# Patient Record
Sex: FEMALE | Race: WHITE | NOT HISPANIC OR LATINO | Employment: PART TIME | URBAN - METROPOLITAN AREA
[De-identification: names, ages, dates, MRNs, and addresses within clinical notes are randomized per-mention and may not be internally consistent; named-entity substitution may affect disease eponyms.]

---

## 2017-01-11 ENCOUNTER — HISTORICAL (OUTPATIENT)
Dept: ADMINISTRATIVE | Facility: HOSPITAL | Age: 67
End: 2017-01-11

## 2017-08-22 ENCOUNTER — HISTORICAL (OUTPATIENT)
Dept: ADMINISTRATIVE | Facility: HOSPITAL | Age: 67
End: 2017-08-22

## 2017-08-22 LAB
ABS NEUT (OLG): 1.55 X10(3)/MCL (ref 2.1–9.2)
ALBUMIN SERPL-MCNC: 4 GM/DL (ref 3.4–5)
ALBUMIN/GLOB SERPL: 1 RATIO (ref 1–2)
ALP SERPL-CCNC: 91 UNIT/L (ref 45–117)
ALT SERPL-CCNC: 25 UNIT/L (ref 12–78)
AST SERPL-CCNC: 17 UNIT/L (ref 15–37)
BASOPHILS # BLD AUTO: 0.02 X10(3)/MCL
BASOPHILS NFR BLD AUTO: 1 % (ref 0–1)
BILIRUB SERPL-MCNC: 0.3 MG/DL (ref 0.2–1)
BILIRUBIN DIRECT+TOT PNL SERPL-MCNC: <0.1 MG/DL
BILIRUBIN DIRECT+TOT PNL SERPL-MCNC: >0.2 MG/DL
BUN SERPL-MCNC: 27 MG/DL (ref 7–18)
CALCIUM SERPL-MCNC: 9 MG/DL (ref 8.5–10.1)
CHLORIDE SERPL-SCNC: 105 MMOL/L (ref 98–107)
CO2 SERPL-SCNC: 30 MMOL/L (ref 21–32)
CREAT SERPL-MCNC: 0.8 MG/DL (ref 0.6–1.3)
EOSINOPHIL # BLD AUTO: 0.13 X10(3)/MCL
EOSINOPHIL NFR BLD AUTO: 4 % (ref 0–5)
ERYTHROCYTE [DISTWIDTH] IN BLOOD BY AUTOMATED COUNT: 13.4 % (ref 11.5–14.5)
GLOBULIN SER-MCNC: 3.1 GM/ML (ref 2.3–3.5)
GLUCOSE SERPL-MCNC: 109 MG/DL (ref 74–106)
HCT VFR BLD AUTO: 39.5 % (ref 35–46)
HGB BLD-MCNC: 13 GM/DL (ref 12–16)
IMM GRANULOCYTES # BLD AUTO: 0.01 10*3/UL
IMM GRANULOCYTES NFR BLD AUTO: 0 %
LDH SERPL-CCNC: 171 UNIT/L (ref 84–246)
LYMPHOCYTES # BLD AUTO: 1.05 X10(3)/MCL
LYMPHOCYTES NFR BLD AUTO: 34 % (ref 15–40)
MCH RBC QN AUTO: 29.6 PG (ref 26–34)
MCHC RBC AUTO-ENTMCNC: 32.9 GM/DL (ref 31–37)
MCV RBC AUTO: 90 FL (ref 80–100)
MONOCYTES # BLD AUTO: 0.33 X10(3)/MCL
MONOCYTES NFR BLD AUTO: 11 % (ref 4–12)
NEUTROPHILS # BLD AUTO: 1.55 X10(3)/MCL
NEUTROPHILS NFR BLD AUTO: 50 X10(3)/MCL
PLATELET # BLD AUTO: 186 X10(3)/MCL (ref 130–400)
PMV BLD AUTO: 10.1 FL (ref 7.4–10.4)
POTASSIUM SERPL-SCNC: 3.7 MMOL/L (ref 3.5–5.1)
PROT SERPL-MCNC: 7.1 GM/DL (ref 6.4–8.2)
RBC # BLD AUTO: 4.39 X10(6)/MCL (ref 4–5.2)
SODIUM SERPL-SCNC: 142 MMOL/L (ref 136–145)
WBC # SPEC AUTO: 3.1 X10(3)/MCL (ref 4.5–11)

## 2017-08-31 ENCOUNTER — HISTORICAL (OUTPATIENT)
Dept: RADIOLOGY | Facility: HOSPITAL | Age: 67
End: 2017-08-31

## 2018-01-31 ENCOUNTER — HISTORICAL (OUTPATIENT)
Dept: ADMINISTRATIVE | Facility: HOSPITAL | Age: 68
End: 2018-01-31

## 2018-03-05 ENCOUNTER — HISTORICAL (OUTPATIENT)
Dept: ADMINISTRATIVE | Facility: HOSPITAL | Age: 68
End: 2018-03-05

## 2018-03-05 LAB
ABS NEUT (OLG): 1.78 X10(3)/MCL (ref 2.1–9.2)
ALBUMIN SERPL-MCNC: 4.1 GM/DL (ref 3.4–5)
ALBUMIN/GLOB SERPL: 1 RATIO (ref 1–2)
ALP SERPL-CCNC: 102 UNIT/L (ref 45–117)
ALT SERPL-CCNC: 35 UNIT/L (ref 12–78)
AST SERPL-CCNC: 23 UNIT/L (ref 15–37)
BASOPHILS # BLD AUTO: 0.01 X10(3)/MCL
BASOPHILS NFR BLD AUTO: 0 %
BILIRUB SERPL-MCNC: 0.4 MG/DL (ref 0.2–1)
BILIRUBIN DIRECT+TOT PNL SERPL-MCNC: <0.1 MG/DL
BILIRUBIN DIRECT+TOT PNL SERPL-MCNC: ABNORMAL MG/DL
BUN SERPL-MCNC: 18 MG/DL (ref 7–18)
CALCIUM SERPL-MCNC: 9.4 MG/DL (ref 8.5–10.1)
CHLORIDE SERPL-SCNC: 104 MMOL/L (ref 98–107)
CO2 SERPL-SCNC: 33 MMOL/L (ref 21–32)
CREAT SERPL-MCNC: 0.8 MG/DL (ref 0.6–1.3)
EOSINOPHIL # BLD AUTO: 0.06 X10(3)/MCL
EOSINOPHIL NFR BLD AUTO: 2 %
ERYTHROCYTE [DISTWIDTH] IN BLOOD BY AUTOMATED COUNT: 13.4 % (ref 11.5–14.5)
GLOBULIN SER-MCNC: 3.5 GM/ML (ref 2.3–3.5)
GLUCOSE SERPL-MCNC: 92 MG/DL (ref 74–106)
HCT VFR BLD AUTO: 40.7 % (ref 35–46)
HGB BLD-MCNC: 13.5 GM/DL (ref 12–16)
IMM GRANULOCYTES # BLD AUTO: 0.02 10*3/UL
IMM GRANULOCYTES NFR BLD AUTO: 1 %
LDH SERPL-CCNC: 162 UNIT/L (ref 84–246)
LYMPHOCYTES # BLD AUTO: 1.06 X10(3)/MCL
LYMPHOCYTES NFR BLD AUTO: 33 % (ref 13–40)
MCH RBC QN AUTO: 30.6 PG (ref 26–34)
MCHC RBC AUTO-ENTMCNC: 33.2 GM/DL (ref 31–37)
MCV RBC AUTO: 92.3 FL (ref 80–100)
MONOCYTES # BLD AUTO: 0.27 X10(3)/MCL
MONOCYTES NFR BLD AUTO: 8 % (ref 4–12)
NEUTROPHILS # BLD AUTO: 1.78 X10(3)/MCL
NEUTROPHILS NFR BLD AUTO: 56 X10(3)/MCL
PLATELET # BLD AUTO: 220 X10(3)/MCL (ref 130–400)
PMV BLD AUTO: 9.7 FL (ref 7.4–10.4)
POTASSIUM SERPL-SCNC: 3.9 MMOL/L (ref 3.5–5.1)
PROT SERPL-MCNC: 7.6 GM/DL (ref 6.4–8.2)
RBC # BLD AUTO: 4.41 X10(6)/MCL (ref 4–5.2)
SODIUM SERPL-SCNC: 143 MMOL/L (ref 136–145)
WBC # SPEC AUTO: 3.2 X10(3)/MCL (ref 4.5–11)

## 2018-08-06 ENCOUNTER — HISTORICAL (OUTPATIENT)
Dept: RADIOLOGY | Facility: HOSPITAL | Age: 68
End: 2018-08-06

## 2018-08-06 LAB
ABS NEUT (OLG): 1.49 X10(3)/MCL (ref 2.1–9.2)
ALBUMIN SERPL-MCNC: 4.1 GM/DL (ref 3.4–5)
ALBUMIN/GLOB SERPL: 1 RATIO (ref 1–2)
ALP SERPL-CCNC: 93 UNIT/L (ref 45–117)
ALT SERPL-CCNC: 24 UNIT/L (ref 12–78)
APPEARANCE, UA: CLEAR
AST SERPL-CCNC: 21 UNIT/L (ref 15–37)
BACTERIA #/AREA URNS AUTO: NORMAL /[HPF]
BASOPHILS # BLD AUTO: 0.02 X10(3)/MCL
BASOPHILS NFR BLD AUTO: 1 %
BILIRUB SERPL-MCNC: 0.3 MG/DL (ref 0.2–1)
BILIRUB UR QL STRIP: NEGATIVE
BILIRUBIN DIRECT+TOT PNL SERPL-MCNC: <0.1 MG/DL
BILIRUBIN DIRECT+TOT PNL SERPL-MCNC: ABNORMAL MG/DL
BUN SERPL-MCNC: 19 MG/DL (ref 7–18)
CALCIUM SERPL-MCNC: 9.2 MG/DL (ref 8.5–10.1)
CHLORIDE SERPL-SCNC: 107 MMOL/L (ref 98–107)
CHOLEST SERPL-MCNC: 254 MG/DL
CHOLEST/HDLC SERPL: 3.6 {RATIO} (ref 0–4.4)
CO2 SERPL-SCNC: 30 MMOL/L (ref 21–32)
COLOR UR: YELLOW
CREAT SERPL-MCNC: 0.8 MG/DL (ref 0.6–1.3)
EOSINOPHIL # BLD AUTO: 0.1 10*3/UL
EOSINOPHIL NFR BLD AUTO: 3 %
ERYTHROCYTE [DISTWIDTH] IN BLOOD BY AUTOMATED COUNT: 13.3 % (ref 11.5–14.5)
GLOBULIN SER-MCNC: 3.5 GM/ML (ref 2.3–3.5)
GLUCOSE (UA): NORMAL
GLUCOSE SERPL-MCNC: 103 MG/DL (ref 74–106)
HCT VFR BLD AUTO: 39.8 % (ref 35–46)
HDLC SERPL-MCNC: 70 MG/DL
HGB BLD-MCNC: 13.1 GM/DL (ref 12–16)
HGB UR QL STRIP: NEGATIVE
HYALINE CASTS #/AREA URNS LPF: 0 /[LPF]
IMM GRANULOCYTES # BLD AUTO: 0.01 10*3/UL
IMM GRANULOCYTES NFR BLD AUTO: 0 %
KETONES UR QL STRIP: NEGATIVE
LDH SERPL-CCNC: 169 UNIT/L (ref 84–246)
LDLC SERPL CALC-MCNC: 161 MG/DL (ref 0–130)
LEUKOCYTE ESTERASE UR QL STRIP: 25 LEU/UL
LYMPHOCYTES # BLD AUTO: 1.37 X10(3)/MCL
LYMPHOCYTES NFR BLD AUTO: 42 % (ref 13–40)
MCH RBC QN AUTO: 30.6 PG (ref 26–34)
MCHC RBC AUTO-ENTMCNC: 32.9 GM/DL (ref 31–37)
MCV RBC AUTO: 93 FL (ref 80–100)
MONOCYTES # BLD AUTO: 0.29 X10(3)/MCL
MONOCYTES NFR BLD AUTO: 9 % (ref 4–12)
NEUTROPHILS # BLD AUTO: 1.49 X10(3)/MCL
NEUTROPHILS NFR BLD AUTO: 46 X10(3)/MCL
NITRITE UR QL STRIP: NEGATIVE
PH UR STRIP: 6.5 [PH] (ref 4.5–8)
PLATELET # BLD AUTO: 229 X10(3)/MCL (ref 130–400)
PMV BLD AUTO: 10.1 FL (ref 7.4–10.4)
POTASSIUM SERPL-SCNC: 4 MMOL/L (ref 3.5–5.1)
PROT SERPL-MCNC: 7.6 GM/DL (ref 6.4–8.2)
PROT UR QL STRIP: NEGATIVE
RBC # BLD AUTO: 4.28 X10(6)/MCL (ref 4–5.2)
RBC #/AREA URNS AUTO: NORMAL /[HPF]
SODIUM SERPL-SCNC: 143 MMOL/L (ref 136–145)
SP GR UR STRIP: 1.02 (ref 1–1.03)
SQUAMOUS #/AREA URNS LPF: NORMAL /[LPF]
T4 FREE SERPL-MCNC: 0.9 NG/DL (ref 0.76–1.46)
TRIGL SERPL-MCNC: 116 MG/DL
TSH SERPL-ACNC: 6.35 MIU/L (ref 0.36–3.74)
UROBILINOGEN UR STRIP-ACNC: NORMAL
VLDLC SERPL CALC-MCNC: 23 MG/DL
WBC # SPEC AUTO: 3.3 X10(3)/MCL (ref 4.5–11)
WBC #/AREA URNS AUTO: NORMAL /HPF

## 2018-08-20 ENCOUNTER — HISTORICAL (OUTPATIENT)
Dept: RADIOLOGY | Facility: HOSPITAL | Age: 68
End: 2018-08-20

## 2018-10-31 ENCOUNTER — HISTORICAL (OUTPATIENT)
Dept: SLEEP MEDICINE | Facility: HOSPITAL | Age: 68
End: 2018-10-31

## 2019-01-08 ENCOUNTER — HISTORICAL (OUTPATIENT)
Dept: INTERNAL MEDICINE | Facility: CLINIC | Age: 69
End: 2019-01-08

## 2019-01-08 LAB
ALBUMIN SERPL-MCNC: 4 GM/DL (ref 3.4–5)
ALBUMIN/GLOB SERPL: 1 RATIO (ref 1–2)
ALP SERPL-CCNC: 97 UNIT/L (ref 45–117)
ALT SERPL-CCNC: 27 UNIT/L (ref 12–78)
AST SERPL-CCNC: 19 UNIT/L (ref 15–37)
BILIRUB SERPL-MCNC: 0.3 MG/DL (ref 0.2–1)
BILIRUBIN DIRECT+TOT PNL SERPL-MCNC: <0.1 MG/DL
BILIRUBIN DIRECT+TOT PNL SERPL-MCNC: ABNORMAL MG/DL
BUN SERPL-MCNC: 19 MG/DL (ref 7–18)
CALCIUM SERPL-MCNC: 9.1 MG/DL (ref 8.5–10.1)
CHLORIDE SERPL-SCNC: 105 MMOL/L (ref 98–107)
CHOLEST SERPL-MCNC: 262 MG/DL
CHOLEST/HDLC SERPL: 3.6 {RATIO} (ref 0–4.4)
CO2 SERPL-SCNC: 31 MMOL/L (ref 21–32)
CREAT SERPL-MCNC: 0.8 MG/DL (ref 0.6–1.3)
GLOBULIN SER-MCNC: 3.5 GM/ML (ref 2.3–3.5)
GLUCOSE SERPL-MCNC: 96 MG/DL (ref 74–106)
HDLC SERPL-MCNC: 72 MG/DL
LDLC SERPL CALC-MCNC: 168 MG/DL (ref 0–130)
POTASSIUM SERPL-SCNC: 4.1 MMOL/L (ref 3.5–5.1)
PROT SERPL-MCNC: 7.5 GM/DL (ref 6.4–8.2)
SODIUM SERPL-SCNC: 142 MMOL/L (ref 136–145)
TRIGL SERPL-MCNC: 111 MG/DL
VLDLC SERPL CALC-MCNC: 22 MG/DL

## 2019-01-17 ENCOUNTER — HISTORICAL (OUTPATIENT)
Dept: RADIOLOGY | Facility: HOSPITAL | Age: 69
End: 2019-01-17

## 2019-02-26 ENCOUNTER — HISTORICAL (OUTPATIENT)
Dept: ADMINISTRATIVE | Facility: HOSPITAL | Age: 69
End: 2019-02-26

## 2019-05-08 ENCOUNTER — HISTORICAL (OUTPATIENT)
Dept: INTERNAL MEDICINE | Facility: CLINIC | Age: 69
End: 2019-05-08

## 2019-05-08 LAB
ALBUMIN SERPL-MCNC: 4 GM/DL (ref 3.4–5)
ALBUMIN/GLOB SERPL: 1.1 RATIO (ref 1.1–2)
ALP SERPL-CCNC: 92 UNIT/L (ref 45–117)
ALT SERPL-CCNC: 27 UNIT/L (ref 12–78)
AST SERPL-CCNC: 22 UNIT/L (ref 15–37)
BILIRUB SERPL-MCNC: 0.2 MG/DL (ref 0.2–1)
BILIRUBIN DIRECT+TOT PNL SERPL-MCNC: <0.1 MG/DL
BILIRUBIN DIRECT+TOT PNL SERPL-MCNC: ABNORMAL MG/DL
BUN SERPL-MCNC: 18 MG/DL (ref 7–18)
CALCIUM SERPL-MCNC: 9.7 MG/DL (ref 8.5–10.1)
CHLORIDE SERPL-SCNC: 110 MMOL/L (ref 98–107)
CHOLEST SERPL-MCNC: 195 MG/DL
CHOLEST/HDLC SERPL: 2.6 {RATIO} (ref 0–4.4)
CO2 SERPL-SCNC: 31 MMOL/L (ref 21–32)
CREAT SERPL-MCNC: 0.8 MG/DL (ref 0.6–1.3)
GLOBULIN SER-MCNC: 3.5 GM/ML (ref 2.3–3.5)
GLUCOSE SERPL-MCNC: 100 MG/DL (ref 74–106)
HDLC SERPL-MCNC: 74 MG/DL
LDLC SERPL CALC-MCNC: 99 MG/DL (ref 0–130)
POTASSIUM SERPL-SCNC: 5 MMOL/L (ref 3.5–5.1)
PROT SERPL-MCNC: 7.5 GM/DL (ref 6.4–8.2)
SODIUM SERPL-SCNC: 144 MMOL/L (ref 136–145)
TRIGL SERPL-MCNC: 110 MG/DL
VLDLC SERPL CALC-MCNC: 22 MG/DL

## 2019-08-13 ENCOUNTER — HISTORICAL (OUTPATIENT)
Dept: RADIOLOGY | Facility: HOSPITAL | Age: 69
End: 2019-08-13

## 2019-08-13 LAB
ABS NEUT (OLG): 1.47 X10(3)/MCL (ref 2.1–9.2)
ALBUMIN SERPL-MCNC: 4 GM/DL (ref 3.4–5)
ALBUMIN/GLOB SERPL: 1.2 RATIO (ref 1.1–2)
ALP SERPL-CCNC: 94 UNIT/L (ref 45–117)
ALT SERPL-CCNC: 26 UNIT/L (ref 12–78)
AST SERPL-CCNC: 19 UNIT/L (ref 15–37)
BASOPHILS # BLD AUTO: 0.02 X10(3)/MCL
BASOPHILS NFR BLD AUTO: 0 %
BILIRUB SERPL-MCNC: 0.2 MG/DL (ref 0.2–1)
BILIRUBIN DIRECT+TOT PNL SERPL-MCNC: <0.1 MG/DL
BILIRUBIN DIRECT+TOT PNL SERPL-MCNC: ABNORMAL MG/DL
BUN SERPL-MCNC: 23 MG/DL (ref 7–18)
CALCIUM SERPL-MCNC: 9.1 MG/DL (ref 8.5–10.1)
CHLORIDE SERPL-SCNC: 111 MMOL/L (ref 98–107)
CO2 SERPL-SCNC: 26 MMOL/L (ref 21–32)
CREAT SERPL-MCNC: 0.7 MG/DL (ref 0.6–1.3)
EOSINOPHIL # BLD AUTO: 0.08 X10(3)/MCL
EOSINOPHIL NFR BLD AUTO: 2 %
ERYTHROCYTE [DISTWIDTH] IN BLOOD BY AUTOMATED COUNT: 13.2 % (ref 11.5–14.5)
GLOBULIN SER-MCNC: 3.3 GM/ML (ref 2.3–3.5)
GLUCOSE SERPL-MCNC: 101 MG/DL (ref 74–106)
HCT VFR BLD AUTO: 41.3 % (ref 35–46)
HGB BLD-MCNC: 13.4 GM/DL (ref 12–16)
LDH SERPL-CCNC: 148 UNIT/L (ref 84–246)
LYMPHOCYTES # BLD AUTO: 1.9 X10(3)/MCL
LYMPHOCYTES NFR BLD AUTO: 49 % (ref 13–40)
MCH RBC QN AUTO: 30 PG (ref 26–34)
MCHC RBC AUTO-ENTMCNC: 32.4 GM/DL (ref 31–37)
MCV RBC AUTO: 92.6 FL (ref 80–100)
MONOCYTES # BLD AUTO: 0.42 X10(3)/MCL
MONOCYTES NFR BLD AUTO: 11 % (ref 0–24)
NEUTROPHILS # BLD AUTO: 1.47 X10(3)/MCL
NEUTROPHILS NFR BLD AUTO: 38 X10(3)/MCL
PLATELET # BLD AUTO: 216 X10(3)/MCL (ref 130–400)
PMV BLD AUTO: 9.9 FL (ref 7.4–10.4)
POTASSIUM SERPL-SCNC: 3.9 MMOL/L (ref 3.5–5.1)
PROT SERPL-MCNC: 7.3 GM/DL (ref 6.4–8.2)
RBC # BLD AUTO: 4.46 X10(6)/MCL (ref 4–5.2)
SODIUM SERPL-SCNC: 143 MMOL/L (ref 136–145)
WBC # SPEC AUTO: 3.9 X10(3)/MCL (ref 4.5–11)

## 2019-11-08 ENCOUNTER — HISTORICAL (OUTPATIENT)
Dept: INTERNAL MEDICINE | Facility: CLINIC | Age: 69
End: 2019-11-08

## 2019-11-08 LAB
ALBUMIN SERPL-MCNC: 4 GM/DL (ref 3.4–5)
ALBUMIN/GLOB SERPL: 1.1 RATIO (ref 1.1–2)
ALP SERPL-CCNC: 101 UNIT/L (ref 45–117)
ALT SERPL-CCNC: 25 UNIT/L (ref 12–78)
AST SERPL-CCNC: 18 UNIT/L (ref 15–37)
BILIRUB SERPL-MCNC: 0.4 MG/DL (ref 0.2–1)
BILIRUBIN DIRECT+TOT PNL SERPL-MCNC: <0.1 MG/DL (ref 0–0.2)
BILIRUBIN DIRECT+TOT PNL SERPL-MCNC: ABNORMAL MG/DL
BUN SERPL-MCNC: 18 MG/DL (ref 7–18)
CALCIUM SERPL-MCNC: 9.4 MG/DL (ref 8.5–10.1)
CHLORIDE SERPL-SCNC: 108 MMOL/L (ref 98–107)
CHOLEST SERPL-MCNC: 222 MG/DL
CHOLEST/HDLC SERPL: 3 {RATIO} (ref 0–4.4)
CO2 SERPL-SCNC: 31 MMOL/L (ref 21–32)
CREAT SERPL-MCNC: 0.8 MG/DL (ref 0.6–1.3)
GLOBULIN SER-MCNC: 3.6 GM/ML (ref 2.3–3.5)
GLUCOSE SERPL-MCNC: 97 MG/DL (ref 74–106)
HDLC SERPL-MCNC: 73 MG/DL (ref 40–59)
LDLC SERPL CALC-MCNC: 130 MG/DL
POTASSIUM SERPL-SCNC: 4.2 MMOL/L (ref 3.5–5.1)
PROT SERPL-MCNC: 7.6 GM/DL (ref 6.4–8.2)
SODIUM SERPL-SCNC: 144 MMOL/L (ref 136–145)
TRIGL SERPL-MCNC: 94 MG/DL
VLDLC SERPL CALC-MCNC: 19 MG/DL

## 2020-01-20 ENCOUNTER — HISTORICAL (OUTPATIENT)
Dept: RADIOLOGY | Facility: HOSPITAL | Age: 70
End: 2020-01-20

## 2020-02-10 ENCOUNTER — HISTORICAL (OUTPATIENT)
Dept: INTERNAL MEDICINE | Facility: CLINIC | Age: 70
End: 2020-02-10

## 2020-02-10 LAB
ABS NEUT (OLG): 2.76 X10(3)/MCL (ref 2.1–9.2)
ALBUMIN SERPL-MCNC: 3.9 GM/DL (ref 3.4–5)
ALBUMIN/GLOB SERPL: 1.1 RATIO (ref 1.1–2)
ALP SERPL-CCNC: 89 UNIT/L (ref 45–117)
ALT SERPL-CCNC: 23 UNIT/L (ref 12–78)
AST SERPL-CCNC: 16 UNIT/L (ref 15–37)
BASOPHILS # BLD AUTO: 0 X10(3)/MCL (ref 0–0.2)
BASOPHILS NFR BLD AUTO: 0 %
BILIRUB SERPL-MCNC: 0.2 MG/DL (ref 0.2–1)
BILIRUBIN DIRECT+TOT PNL SERPL-MCNC: <0.1 MG/DL (ref 0–0.2)
BILIRUBIN DIRECT+TOT PNL SERPL-MCNC: >0.1 MG/DL
BUN SERPL-MCNC: 20 MG/DL (ref 7–18)
CALCIUM SERPL-MCNC: 9.2 MG/DL (ref 8.5–10.1)
CHLORIDE SERPL-SCNC: 108 MMOL/L (ref 98–107)
CHOLEST SERPL-MCNC: 221 MG/DL
CHOLEST/HDLC SERPL: 2.8 {RATIO} (ref 0–4.4)
CO2 SERPL-SCNC: 28 MMOL/L (ref 21–32)
CREAT SERPL-MCNC: 0.8 MG/DL (ref 0.6–1.3)
EOSINOPHIL # BLD AUTO: 0.1 X10(3)/MCL (ref 0–0.9)
EOSINOPHIL NFR BLD AUTO: 2 %
ERYTHROCYTE [DISTWIDTH] IN BLOOD BY AUTOMATED COUNT: 13.3 % (ref 11.5–14.5)
GLOBULIN SER-MCNC: 3.5 GM/ML (ref 2.3–3.5)
GLUCOSE SERPL-MCNC: 93 MG/DL (ref 74–106)
HCT VFR BLD AUTO: 40.3 % (ref 35–46)
HDLC SERPL-MCNC: 79 MG/DL (ref 40–59)
HGB BLD-MCNC: 12.9 GM/DL (ref 12–16)
IMM GRANULOCYTES # BLD AUTO: 0.01 10*3/UL
IMM GRANULOCYTES NFR BLD AUTO: 0 %
LDLC SERPL CALC-MCNC: 117 MG/DL
LYMPHOCYTES # BLD AUTO: 1.4 X10(3)/MCL (ref 0.6–4.6)
LYMPHOCYTES NFR BLD AUTO: 30 %
MCH RBC QN AUTO: 29.9 PG (ref 26–34)
MCHC RBC AUTO-ENTMCNC: 32 GM/DL (ref 31–37)
MCV RBC AUTO: 93.5 FL (ref 80–100)
MONOCYTES # BLD AUTO: 0.4 X10(3)/MCL (ref 0.1–1.3)
MONOCYTES NFR BLD AUTO: 8 %
NEUTROPHILS # BLD AUTO: 2.76 X10(3)/MCL (ref 2.1–9.2)
NEUTROPHILS NFR BLD AUTO: 60 %
PLATELET # BLD AUTO: 214 X10(3)/MCL (ref 130–400)
PMV BLD AUTO: 9.9 FL (ref 7.4–10.4)
POTASSIUM SERPL-SCNC: 3.8 MMOL/L (ref 3.5–5.1)
PROT SERPL-MCNC: 7.4 GM/DL (ref 6.4–8.2)
RBC # BLD AUTO: 4.31 X10(6)/MCL (ref 4–5.2)
SODIUM SERPL-SCNC: 141 MMOL/L (ref 136–145)
TRIGL SERPL-MCNC: 124 MG/DL
VLDLC SERPL CALC-MCNC: 25 MG/DL
WBC # SPEC AUTO: 4.6 X10(3)/MCL (ref 4.5–11)

## 2020-03-08 ENCOUNTER — HISTORICAL (OUTPATIENT)
Dept: ADMINISTRATIVE | Facility: HOSPITAL | Age: 70
End: 2020-03-08

## 2020-03-10 LAB — FINAL CULTURE: NORMAL

## 2020-08-21 ENCOUNTER — HISTORICAL (OUTPATIENT)
Dept: ADMINISTRATIVE | Facility: HOSPITAL | Age: 70
End: 2020-08-21

## 2020-08-21 LAB
ALBUMIN SERPL-MCNC: 4 GM/DL (ref 3.4–5)
ALBUMIN/GLOB SERPL: 1.1 RATIO (ref 1.1–2)
ALP SERPL-CCNC: 88 UNIT/L (ref 45–117)
ALT SERPL-CCNC: 25 UNIT/L (ref 12–78)
AST SERPL-CCNC: 22 UNIT/L (ref 15–37)
BILIRUB SERPL-MCNC: 0.3 MG/DL (ref 0.2–1)
BILIRUBIN DIRECT+TOT PNL SERPL-MCNC: <0.1 MG/DL (ref 0–0.2)
BILIRUBIN DIRECT+TOT PNL SERPL-MCNC: ABNORMAL MG/DL
BUN SERPL-MCNC: 21 MG/DL (ref 7–18)
CALCIUM SERPL-MCNC: 9.4 MG/DL (ref 8.5–10.1)
CHLORIDE SERPL-SCNC: 106 MMOL/L (ref 98–107)
CHOLEST SERPL-MCNC: 242 MG/DL
CHOLEST/HDLC SERPL: 3.9 {RATIO} (ref 0–4.4)
CO2 SERPL-SCNC: 29 MMOL/L (ref 21–32)
CREAT SERPL-MCNC: 0.8 MG/DL (ref 0.6–1.3)
GLOBULIN SER-MCNC: 3.6 GM/ML (ref 2.3–3.5)
GLUCOSE SERPL-MCNC: 99 MG/DL (ref 74–106)
HDLC SERPL-MCNC: 62 MG/DL (ref 40–59)
LDLC SERPL CALC-MCNC: 139 MG/DL
POTASSIUM SERPL-SCNC: 3.8 MMOL/L (ref 3.5–5.1)
PROT SERPL-MCNC: 7.6 GM/DL (ref 6.4–8.2)
SODIUM SERPL-SCNC: 142 MMOL/L (ref 136–145)
TRIGL SERPL-MCNC: 206 MG/DL
VLDLC SERPL CALC-MCNC: 41 MG/DL

## 2020-08-24 ENCOUNTER — HISTORICAL (OUTPATIENT)
Dept: RADIOLOGY | Facility: HOSPITAL | Age: 70
End: 2020-08-24

## 2020-08-28 ENCOUNTER — HISTORICAL (OUTPATIENT)
Dept: RADIOLOGY | Facility: HOSPITAL | Age: 70
End: 2020-08-28

## 2020-09-04 ENCOUNTER — HISTORICAL (OUTPATIENT)
Dept: ADMINISTRATIVE | Facility: HOSPITAL | Age: 70
End: 2020-09-04

## 2020-09-04 LAB
ABS NEUT (OLG): 1.14 X10(3)/MCL (ref 2.1–9.2)
ALBUMIN SERPL-MCNC: 3.9 GM/DL (ref 3.4–5)
ALBUMIN/GLOB SERPL: 1.2 RATIO (ref 1.1–2)
ALP SERPL-CCNC: 86 UNIT/L (ref 45–117)
ALT SERPL-CCNC: 26 UNIT/L (ref 12–78)
AST SERPL-CCNC: 18 UNIT/L (ref 15–37)
BASOPHILS NFR BLD MANUAL: 0 %
BILIRUB SERPL-MCNC: 0.3 MG/DL (ref 0.2–1)
BILIRUBIN DIRECT+TOT PNL SERPL-MCNC: <0.1 MG/DL (ref 0–0.2)
BILIRUBIN DIRECT+TOT PNL SERPL-MCNC: ABNORMAL MG/DL
BUN SERPL-MCNC: 21 MG/DL (ref 7–18)
CALCIUM SERPL-MCNC: 9.2 MG/DL (ref 8.5–10.1)
CHLORIDE SERPL-SCNC: 108 MMOL/L (ref 98–107)
CO2 SERPL-SCNC: 28 MMOL/L (ref 21–32)
CREAT SERPL-MCNC: 0.8 MG/DL (ref 0.6–1.3)
EOSINOPHIL NFR BLD MANUAL: 2 %
ERYTHROCYTE [DISTWIDTH] IN BLOOD BY AUTOMATED COUNT: 13.2 % (ref 11.5–14.5)
GLOBULIN SER-MCNC: 3.3 GM/ML (ref 2.3–3.5)
GLUCOSE SERPL-MCNC: 89 MG/DL (ref 74–106)
GRANULOCYTES NFR BLD MANUAL: 38 % (ref 43–75)
HCT VFR BLD AUTO: 42 % (ref 35–46)
HGB BLD-MCNC: 13.4 GM/DL (ref 12–16)
LDH SERPL-CCNC: 150 UNIT/L (ref 84–246)
LYMPHOCYTES NFR BLD MANUAL: 56 % (ref 20.5–51.1)
MCH RBC QN AUTO: 30.1 PG (ref 26–34)
MCHC RBC AUTO-ENTMCNC: 31.9 GM/DL (ref 31–37)
MCV RBC AUTO: 94.4 FL (ref 80–100)
MONOCYTES NFR BLD MANUAL: 4 % (ref 2–9)
PLATELET # BLD AUTO: 226 X10(3)/MCL (ref 130–400)
PLATELET # BLD EST: ADEQUATE 10*3/UL
PMV BLD AUTO: 10.1 FL (ref 7.4–10.4)
POTASSIUM SERPL-SCNC: 4.4 MMOL/L (ref 3.5–5.1)
PROT SERPL-MCNC: 7.2 GM/DL (ref 6.4–8.2)
RBC # BLD AUTO: 4.45 X10(6)/MCL (ref 4–5.2)
RBC MORPH BLD: NORMAL
SODIUM SERPL-SCNC: 142 MMOL/L (ref 136–145)
WBC # SPEC AUTO: 3.7 X10(3)/MCL (ref 4.5–11)

## 2020-10-07 ENCOUNTER — HISTORICAL (OUTPATIENT)
Dept: ADMINISTRATIVE | Facility: HOSPITAL | Age: 70
End: 2020-10-07

## 2020-10-07 LAB
ABS NEUT (OLG): 2.08 X10(3)/MCL (ref 2.1–9.2)
ALBUMIN SERPL-MCNC: 4 GM/DL (ref 3.4–5)
ALBUMIN/GLOB SERPL: 1.2 RATIO (ref 1.1–2)
ALP SERPL-CCNC: 86 UNIT/L (ref 45–117)
ALT SERPL-CCNC: 26 UNIT/L (ref 12–78)
AST SERPL-CCNC: 17 UNIT/L (ref 15–37)
BASOPHILS # BLD AUTO: 0 X10(3)/MCL (ref 0–0.2)
BASOPHILS NFR BLD AUTO: 0 %
BILIRUB SERPL-MCNC: 0.2 MG/DL (ref 0.2–1)
BILIRUBIN DIRECT+TOT PNL SERPL-MCNC: <0.1 MG/DL (ref 0–0.2)
BILIRUBIN DIRECT+TOT PNL SERPL-MCNC: >0.1 MG/DL
BUN SERPL-MCNC: 22 MG/DL (ref 7–18)
CALCIUM SERPL-MCNC: 9.9 MG/DL (ref 8.5–10.1)
CHLORIDE SERPL-SCNC: 106 MMOL/L (ref 98–107)
CO2 SERPL-SCNC: 30 MMOL/L (ref 21–32)
CREAT SERPL-MCNC: 0.8 MG/DL (ref 0.6–1.3)
EOSINOPHIL # BLD AUTO: 0.1 X10(3)/MCL (ref 0–0.9)
EOSINOPHIL NFR BLD AUTO: 2 %
ERYTHROCYTE [DISTWIDTH] IN BLOOD BY AUTOMATED COUNT: 13.5 % (ref 11.5–14.5)
GLOBULIN SER-MCNC: 3.4 GM/ML (ref 2.3–3.5)
GLUCOSE SERPL-MCNC: 107 MG/DL (ref 74–106)
HCT VFR BLD AUTO: 39.6 % (ref 35–46)
HGB BLD-MCNC: 12.8 GM/DL (ref 12–16)
IMM GRANULOCYTES # BLD AUTO: 0.01 10*3/UL
IMM GRANULOCYTES NFR BLD AUTO: 0 %
LDH SERPL-CCNC: 152 UNIT/L (ref 84–246)
LYMPHOCYTES # BLD AUTO: 1.6 X10(3)/MCL (ref 0.6–4.6)
LYMPHOCYTES NFR BLD AUTO: 38 %
MCH RBC QN AUTO: 30.3 PG (ref 26–34)
MCHC RBC AUTO-ENTMCNC: 32.3 GM/DL (ref 31–37)
MCV RBC AUTO: 93.8 FL (ref 80–100)
MONOCYTES # BLD AUTO: 0.4 X10(3)/MCL (ref 0.1–1.3)
MONOCYTES NFR BLD AUTO: 9 %
NEUTROPHILS # BLD AUTO: 2.08 X10(3)/MCL (ref 2.1–9.2)
NEUTROPHILS NFR BLD AUTO: 49 %
PLATELET # BLD AUTO: 224 X10(3)/MCL (ref 130–400)
PMV BLD AUTO: 10.6 FL (ref 7.4–10.4)
POTASSIUM SERPL-SCNC: 4.4 MMOL/L (ref 3.5–5.1)
PROT SERPL-MCNC: 7.4 GM/DL (ref 6.4–8.2)
RBC # BLD AUTO: 4.22 X10(6)/MCL (ref 4–5.2)
SODIUM SERPL-SCNC: 141 MMOL/L (ref 136–145)
WBC # SPEC AUTO: 4.2 X10(3)/MCL (ref 4.5–11)

## 2020-12-29 ENCOUNTER — HISTORICAL (OUTPATIENT)
Dept: ADMINISTRATIVE | Facility: HOSPITAL | Age: 70
End: 2020-12-29

## 2020-12-29 LAB
FLUAV AG UPPER RESP QL IA.RAPID: NEGATIVE
FLUBV AG UPPER RESP QL IA.RAPID: NEGATIVE
SARS-COV-2 RNA RESP QL NAA+PROBE: NOT DETECTED

## 2021-02-25 ENCOUNTER — HISTORICAL (OUTPATIENT)
Dept: RADIOLOGY | Facility: HOSPITAL | Age: 71
End: 2021-02-25

## 2021-02-26 ENCOUNTER — HISTORICAL (OUTPATIENT)
Dept: ADMINISTRATIVE | Facility: HOSPITAL | Age: 71
End: 2021-02-26

## 2021-02-26 LAB
ABS NEUT (OLG): 1.39 X10(3)/MCL (ref 2.1–9.2)
ALBUMIN SERPL-MCNC: 4.3 GM/DL (ref 3.4–4.8)
ALBUMIN/GLOB SERPL: 1.5 RATIO (ref 1.1–2)
ALP SERPL-CCNC: 89 UNIT/L (ref 40–150)
ALT SERPL-CCNC: 24 UNIT/L (ref 0–55)
AST SERPL-CCNC: 23 UNIT/L (ref 5–34)
BASOPHILS # BLD AUTO: 0 X10(3)/MCL (ref 0–0.2)
BASOPHILS NFR BLD AUTO: 1 %
BILIRUB SERPL-MCNC: 0.5 MG/DL
BILIRUBIN DIRECT+TOT PNL SERPL-MCNC: 0.2 MG/DL (ref 0–0.5)
BILIRUBIN DIRECT+TOT PNL SERPL-MCNC: 0.3 MG/DL (ref 0–0.8)
BUN SERPL-MCNC: 16.7 MG/DL (ref 9.8–20.1)
CALCIUM SERPL-MCNC: 9.3 MG/DL (ref 8.4–10.2)
CHLORIDE SERPL-SCNC: 103 MMOL/L (ref 98–107)
CHOLEST SERPL-MCNC: 225 MG/DL
CHOLEST/HDLC SERPL: 3 {RATIO} (ref 0–5)
CO2 SERPL-SCNC: 29 MMOL/L (ref 23–31)
CREAT SERPL-MCNC: 0.79 MG/DL (ref 0.55–1.02)
EOSINOPHIL # BLD AUTO: 0.1 X10(3)/MCL (ref 0–0.9)
EOSINOPHIL NFR BLD AUTO: 2 %
ERYTHROCYTE [DISTWIDTH] IN BLOOD BY AUTOMATED COUNT: 13.6 % (ref 11.5–14.5)
GLOBULIN SER-MCNC: 2.9 GM/DL (ref 2.4–3.5)
GLUCOSE SERPL-MCNC: 98 MG/DL (ref 82–115)
HCT VFR BLD AUTO: 40.5 % (ref 35–46)
HDLC SERPL-MCNC: 72 MG/DL (ref 35–60)
HGB BLD-MCNC: 13.1 GM/DL (ref 12–16)
IMM GRANULOCYTES # BLD AUTO: 0.01 10*3/UL
IMM GRANULOCYTES NFR BLD AUTO: 0 %
LDLC SERPL CALC-MCNC: 133 MG/DL (ref 50–140)
LYMPHOCYTES # BLD AUTO: 1.4 X10(3)/MCL (ref 0.6–4.6)
LYMPHOCYTES NFR BLD AUTO: 43 %
MCH RBC QN AUTO: 30.5 PG (ref 26–34)
MCHC RBC AUTO-ENTMCNC: 32.3 GM/DL (ref 31–37)
MCV RBC AUTO: 94.4 FL (ref 80–100)
MONOCYTES # BLD AUTO: 0.3 X10(3)/MCL (ref 0.1–1.3)
MONOCYTES NFR BLD AUTO: 10 %
NEUTROPHILS # BLD AUTO: 1.39 X10(3)/MCL (ref 2.1–9.2)
NEUTROPHILS NFR BLD AUTO: 44 %
PLATELET # BLD AUTO: 215 X10(3)/MCL (ref 130–400)
PMV BLD AUTO: 10.5 FL (ref 7.4–10.4)
POTASSIUM SERPL-SCNC: 3.8 MMOL/L (ref 3.5–5.1)
PROT SERPL-MCNC: 7.2 GM/DL (ref 5.8–7.6)
RBC # BLD AUTO: 4.29 X10(6)/MCL (ref 4–5.2)
SODIUM SERPL-SCNC: 141 MMOL/L (ref 136–145)
TRIGL SERPL-MCNC: 99 MG/DL (ref 37–140)
VLDLC SERPL CALC-MCNC: 20 MG/DL
WBC # SPEC AUTO: 3.2 X10(3)/MCL (ref 4.5–11)

## 2021-09-10 ENCOUNTER — HISTORICAL (OUTPATIENT)
Dept: LAB | Facility: HOSPITAL | Age: 71
End: 2021-09-10

## 2021-09-10 LAB
ALBUMIN SERPL-MCNC: 3.8 GM/DL (ref 3.4–4.8)
ALBUMIN/GLOB SERPL: 1.2 RATIO (ref 1.1–2)
ALP SERPL-CCNC: 83 UNIT/L (ref 40–150)
ALT SERPL-CCNC: 26 UNIT/L (ref 0–55)
AST SERPL-CCNC: 24 UNIT/L (ref 5–34)
BILIRUB SERPL-MCNC: 0.3 MG/DL
BILIRUBIN DIRECT+TOT PNL SERPL-MCNC: 0.1 MG/DL (ref 0–0.5)
BILIRUBIN DIRECT+TOT PNL SERPL-MCNC: 0.2 MG/DL (ref 0–0.8)
BUN SERPL-MCNC: 16.6 MG/DL (ref 9.8–20.1)
CALCIUM SERPL-MCNC: 9.7 MG/DL (ref 8.4–10.2)
CHLORIDE SERPL-SCNC: 107 MMOL/L (ref 98–107)
CHOLEST SERPL-MCNC: 214 MG/DL
CHOLEST/HDLC SERPL: 4 {RATIO} (ref 0–5)
CO2 SERPL-SCNC: 28 MMOL/L (ref 23–31)
CREAT SERPL-MCNC: 0.77 MG/DL (ref 0.55–1.02)
GLOBULIN SER-MCNC: 3.2 GM/DL (ref 2.4–3.5)
GLUCOSE SERPL-MCNC: 101 MG/DL (ref 82–115)
HDLC SERPL-MCNC: 49 MG/DL (ref 35–60)
LDLC SERPL CALC-MCNC: 143 MG/DL (ref 50–140)
POTASSIUM SERPL-SCNC: 3.9 MMOL/L (ref 3.5–5.1)
PROT SERPL-MCNC: 7 GM/DL (ref 5.8–7.6)
SODIUM SERPL-SCNC: 143 MMOL/L (ref 136–145)
TRIGL SERPL-MCNC: 109 MG/DL (ref 37–140)
VLDLC SERPL CALC-MCNC: 22 MG/DL

## 2021-09-16 ENCOUNTER — HISTORICAL (OUTPATIENT)
Dept: RADIOLOGY | Facility: HOSPITAL | Age: 71
End: 2021-09-16

## 2021-10-20 ENCOUNTER — HISTORICAL (OUTPATIENT)
Dept: ADMINISTRATIVE | Facility: HOSPITAL | Age: 71
End: 2021-10-20

## 2021-10-20 LAB
ABS NEUT (OLG): 2.43 X10(3)/MCL (ref 2.1–9.2)
ALBUMIN SERPL-MCNC: 4.2 GM/DL (ref 3.4–4.8)
ALBUMIN/GLOB SERPL: 1.4 RATIO (ref 1.1–2)
ALP SERPL-CCNC: 73 UNIT/L (ref 40–150)
ALT SERPL-CCNC: 27 UNIT/L (ref 0–55)
AST SERPL-CCNC: 29 UNIT/L (ref 5–34)
BASOPHILS # BLD AUTO: 0 X10(3)/MCL (ref 0–0.2)
BASOPHILS NFR BLD AUTO: 0 %
BILIRUB SERPL-MCNC: 0.3 MG/DL
BILIRUBIN DIRECT+TOT PNL SERPL-MCNC: 0.1 MG/DL (ref 0–0.5)
BILIRUBIN DIRECT+TOT PNL SERPL-MCNC: 0.2 MG/DL (ref 0–0.8)
BUN SERPL-MCNC: 19.5 MG/DL (ref 9.8–20.1)
CALCIUM SERPL-MCNC: 10.6 MG/DL (ref 8.4–10.2)
CHLORIDE SERPL-SCNC: 105 MMOL/L (ref 98–107)
CO2 SERPL-SCNC: 27 MMOL/L (ref 23–31)
CREAT SERPL-MCNC: 0.86 MG/DL (ref 0.55–1.02)
EOSINOPHIL # BLD AUTO: 0.1 X10(3)/MCL (ref 0–0.9)
EOSINOPHIL NFR BLD AUTO: 2 %
ERYTHROCYTE [DISTWIDTH] IN BLOOD BY AUTOMATED COUNT: 14.3 % (ref 11.5–14.5)
GLOBULIN SER-MCNC: 2.9 GM/DL (ref 2.4–3.5)
GLUCOSE SERPL-MCNC: 100 MG/DL (ref 82–115)
HCT VFR BLD AUTO: 39.6 % (ref 35–46)
HGB BLD-MCNC: 13.1 GM/DL (ref 12–16)
LDH SERPL-CCNC: 172 UNIT/L (ref 140–271)
LYMPHOCYTES # BLD AUTO: 1.5 X10(3)/MCL (ref 0.6–4.6)
LYMPHOCYTES NFR BLD AUTO: 33 %
MCH RBC QN AUTO: 30.8 PG (ref 26–34)
MCHC RBC AUTO-ENTMCNC: 33.1 GM/DL (ref 31–37)
MCV RBC AUTO: 93.2 FL (ref 80–100)
MONOCYTES # BLD AUTO: 0.5 X10(3)/MCL (ref 0.1–1.3)
MONOCYTES NFR BLD AUTO: 11 %
NEUTROPHILS # BLD AUTO: 2.43 X10(3)/MCL (ref 2.1–9.2)
NEUTROPHILS NFR BLD AUTO: 53 %
NRBC BLD AUTO-RTO: 0 % (ref 0–0.2)
PLATELET # BLD AUTO: 211 X10(3)/MCL (ref 130–400)
PMV BLD AUTO: 10 FL (ref 7.4–10.4)
POTASSIUM SERPL-SCNC: 4.8 MMOL/L (ref 3.5–5.1)
PROT SERPL-MCNC: 7.1 GM/DL (ref 5.8–7.6)
RBC # BLD AUTO: 4.25 X10(6)/MCL (ref 4–5.2)
SODIUM SERPL-SCNC: 139 MMOL/L (ref 136–145)
WBC # SPEC AUTO: 4.6 X10(3)/MCL (ref 4.5–11)

## 2022-01-12 ENCOUNTER — HISTORICAL (OUTPATIENT)
Dept: ADMINISTRATIVE | Facility: HOSPITAL | Age: 72
End: 2022-01-12

## 2022-01-12 LAB
ALBUMIN SERPL-MCNC: 4.2 GM/DL (ref 3.4–4.8)
ALBUMIN/GLOB SERPL: 1.3 RATIO (ref 1.1–2)
ALP SERPL-CCNC: 92 UNIT/L (ref 40–150)
ALT SERPL-CCNC: 25 UNIT/L (ref 0–55)
AST SERPL-CCNC: 25 UNIT/L (ref 5–34)
BILIRUB SERPL-MCNC: 0.2 MG/DL
BILIRUBIN DIRECT+TOT PNL SERPL-MCNC: 0.1 MG/DL (ref 0–0.5)
BILIRUBIN DIRECT+TOT PNL SERPL-MCNC: 0.1 MG/DL (ref 0–0.8)
BUN SERPL-MCNC: 20.6 MG/DL (ref 9.8–20.1)
CALCIUM SERPL-MCNC: 10.5 MG/DL (ref 8.7–10.5)
CHLORIDE SERPL-SCNC: 105 MMOL/L (ref 98–107)
CHOLEST SERPL-MCNC: 260 MG/DL
CHOLEST/HDLC SERPL: 5 {RATIO} (ref 0–5)
CO2 SERPL-SCNC: 30 MMOL/L (ref 23–31)
CREAT SERPL-MCNC: 0.89 MG/DL (ref 0.55–1.02)
GLOBULIN SER-MCNC: 3.3 GM/DL (ref 2.4–3.5)
GLUCOSE SERPL-MCNC: 90 MG/DL (ref 82–115)
HDLC SERPL-MCNC: 53 MG/DL (ref 35–60)
LDLC SERPL CALC-MCNC: 165 MG/DL (ref 50–140)
POTASSIUM SERPL-SCNC: 3.9 MMOL/L (ref 3.5–5.1)
PROT SERPL-MCNC: 7.5 GM/DL (ref 5.8–7.6)
SODIUM SERPL-SCNC: 142 MMOL/L (ref 136–145)
TRIGL SERPL-MCNC: 209 MG/DL (ref 37–140)
VLDLC SERPL CALC-MCNC: 42 MG/DL

## 2022-04-10 ENCOUNTER — HISTORICAL (OUTPATIENT)
Dept: ADMINISTRATIVE | Facility: HOSPITAL | Age: 72
End: 2022-04-10
Payer: MEDICARE

## 2022-04-20 ENCOUNTER — HISTORICAL (OUTPATIENT)
Dept: ADMINISTRATIVE | Facility: HOSPITAL | Age: 72
End: 2022-04-20
Payer: MEDICARE

## 2022-04-20 LAB
ALBUMIN SERPL-MCNC: 4.2 G/DL (ref 3.4–4.8)
ALBUMIN/GLOB SERPL: 1.4 {RATIO} (ref 1.1–2)
ALP SERPL-CCNC: 85 U/L (ref 40–150)
ALT SERPL-CCNC: 18 U/L (ref 0–55)
AST SERPL-CCNC: 22 U/L (ref 5–34)
BILIRUB SERPL-MCNC: 0.5 MG/DL
BILIRUBIN DIRECT+TOT PNL SERPL-MCNC: 0.2 (ref 0–0.5)
BILIRUBIN DIRECT+TOT PNL SERPL-MCNC: 0.3 (ref 0–0.8)
BUN SERPL-MCNC: 17.3 MG/DL (ref 9.8–20.1)
CALCIUM SERPL-MCNC: 9.9 MG/DL (ref 8.7–10.5)
CHLORIDE SERPL-SCNC: 104 MMOL/L (ref 98–107)
CHOLEST SERPL-MCNC: 223 MG/DL
CHOLEST/HDLC SERPL: 3 {RATIO} (ref 0–5)
CO2 SERPL-SCNC: 30 MMOL/L (ref 23–31)
CREAT SERPL-MCNC: 0.83 MG/DL (ref 0.55–1.02)
GLOBULIN SER-MCNC: 3.1 G/DL (ref 2.4–3.5)
GLUCOSE SERPL-MCNC: 98 MG/DL (ref 82–115)
HDLC SERPL-MCNC: 65 MG/DL (ref 35–60)
HEMOLYSIS INTERF INDEX SERPL-ACNC: 5
ICTERIC INTERF INDEX SERPL-ACNC: 1
LDLC SERPL CALC-MCNC: 134 MG/DL (ref 50–140)
LIPEMIC INTERF INDEX SERPL-ACNC: 4
POTASSIUM SERPL-SCNC: 4.5 MMOL/L (ref 3.5–5.1)
PROT SERPL-MCNC: 7.3 G/DL (ref 5.8–7.6)
SODIUM SERPL-SCNC: 141 MMOL/L (ref 136–145)
TRIGL SERPL-MCNC: 121 MG/DL (ref 37–140)
VLDLC SERPL CALC-MCNC: 24 MG/DL

## 2022-04-25 VITALS
BODY MASS INDEX: 24.18 KG/M2 | OXYGEN SATURATION: 99 % | SYSTOLIC BLOOD PRESSURE: 114 MMHG | DIASTOLIC BLOOD PRESSURE: 73 MMHG | HEIGHT: 63 IN | WEIGHT: 136.44 LBS

## 2022-04-30 NOTE — PROGRESS NOTES
Patient:   Stacey Knight             MRN: [id number]            FIN: [id number]               Age:   69 years     Sex:  Female     :  1950   Associated Diagnoses:   None   Author:   Queta VASQUEZ, Mariah Tafoya reviewed: Will discuss with patient during follow up appointment on  2019 at 10:30am.

## 2022-04-30 NOTE — OP NOTE
Patient:   Stacey Knight             MRN: [id number]            FIN: [id number]               Age:   69 years     Sex:  Female     :  1950   Associated Diagnoses:   None   Author:   Kirit Barone MD      Phacoemulsification of Cataract with Intraocular Implant    Preoperative Diagnosis: Cataract Left Eye    Postoperative Diagnosis: Cataract Left Eye    Surgeon: Kirit Barone MD    Assistant: FAMILIA Willams    Anestheisa: MAC    Complications: None    After the patient underwent topical anesthesia along with IV sedation in the holding area, the patient was brought to the [facility] laser.  The patient was marked at 0 & 180 degrees.  A time out was performed.  The capsulotomy, lens fragmentation and main incision were completed.  Then the patient was brought to the operating suite.  The patient was prepped and draped in a sterile fashion.  A pediatric tegaderm and lid speculum were used to retract the upper and lower lashes and lids.  A 1.0mm paracentesis was then made at the 5 & 11 o'clock position.  Intraocular non-preserved 1% Xylocaine (4% diluted down to 1%) was irrigated into the anterior chamber.  Endocoat was injected into the eye.  A clear corneal incision was made with a 2.4mm Keratome blade.  BSS was used to hydro dissect the nucleus from the capsule.  The nucleus was then phacoemulsified with the Abbott machine for a EFX of 7.  The cortex was then removed with the I/A hand piece and Helon was placed into the posterior bag of the eye.  A posterior chamber implant [id number] of power 11.0 was placed in the capsular bag.  The Helon was then removed from the eye with the I/A hand piece.  The anterior chamber was inflated with BSS and the wound was checked for leaks.  The lid speculum was removed and a drop of Ofloxacin 0.3% was placed in the operative eye.  The patient was brought to the recovery suite in stable condition.          2019 @ [facility]

## 2022-04-30 NOTE — PROGRESS NOTES
Patient:   Stacey Knight             MRN: [id number]            FIN: [id number]               Age:   70 years     Sex:  Female     :  1950   Associated Diagnoses:   None   Author:   Queta VASQUEZ, Mariah Tafoya reviewed: Will discuss with patient during follow up appointment on  2020 at 9:30am.

## 2022-04-30 NOTE — PROGRESS NOTES
Patient:   Stacey Knight             MRN: [id number]            FIN: [id number]               Age:   69 years     Sex:  Female     :  1950   Associated Diagnoses:   None   Author:   Queta VASQUEZ, Mariah Tafoya reviewed: Will discuss with patient during follow up appointment on  May 9, 2019 at 10:50am.

## 2022-05-03 NOTE — HISTORICAL [facility]
This is a historical note converted from [place]. Formatting and pictures may have been removed.  Please reference [place] for original formatting and attached multimedia. History of Present Illness  ?  Problem List:  Stage III NHL, small B-cell, follicular type, grade 1. Diagnosed in 11/2013  ??  Treatment History:  CHOP+ Rituxan x 6 cycles from 12/2013 to 4/2014  Bone marrow biopsy 8/14/14  Maintenance Rituxan, every 8 weeks for 12 cycles, 9/3/2014 through 5/19/2016  ???  History of present illness:  ??67-year-old white female with diagnosis of non-Hodgkin lymphoma stage III diagnosed in 11/2013. Patient initially with subsegmental mass, s/p FNA (9/2013) undeterminant and subsequent excisional biopsy 11/2013 congruent with grade 1 small B-cell follicular lymphoma. ?CT Neck/Chest/Abdomen/Pelvis in 10/2013 and subsequent PET CT 12/2013 revealed bulky LAD (largest up to 14 cm, SUV up to 8, both above and below diaphragm. ?Patient had originally deferred BM biopsy, and subsequently received CHOP+Rituxan x 6 cycles, from 12/2013-4/2014. ?Repeat PET after 3 cycles (2/2014) showed interval decrease in disease burden, and repeat PET following treatment (5/2014) was negative for any active/residual disease.?  ??Completed 6 cycles of RCHOP from 12/2013 to 4/2014, followed by Rituxan maintenance q 8 weeks for 12 cycles, 9/3/2014-5/192016  ??Restaging scans on 1/11/16 and August 2016 show no disease, mesenteric stranding  Restaging scans on 8/31/17 did not reveal progression of disease?  ?   ?  Interval History?  ?   09/04/2019:  -05/02/2016: Screening colonoscopy: Moderate diffuse diverticulosis  -08/20/2018: DEXA scan: Osteopenia of both femurs; normal bone mineral density in the lumbar spine; mean densities within the lumbar spine and femurs have mildly decreased since 2016  -01/17/2019: Screening mammogram: No evidence of malignancy  -02/15/2019: ThinPrep cervical Pap smear: Low-grade squamous intraepithelial  lesion  -03/19/2019:  1. LEEP: Focal mild squamous dysplasia (GUILLERMINA I) with associated HPV effect; the dysplasia focally extends to the equal margin  2.? Endocervical curettage: No atypical or dysplastic cells identified  -06/20/2019: ThinPrep cervical Pap smear: Negative for intraepithelial lesion or malignancy  -08/13/2019: ThinPrep cervical Pap smear: Atypical squamous cells of undetermined significance; high risk HPV positive; high risk HPV 16, 18/45 negative  -08/13/2019: Surveillance CTs C/A/P with contrast: Stable exam since 08/06/2018; stable 1.1 cm hypodense right thyroid lobe nodule; questionable soft tissue prominence of the stomach greater curvature and along the anterior aspect of the antrum  -08/13/2019: Surveillance CT soft tissues of the neck with contrast: Stable versus slightly smaller cervical chain lymph nodes bilaterally  -08/13/2019: CMP unremarkable.? , normal.? WBC 3.9.? ANC 1.47.? Hemoglobin 13.4.? Platelets 216K.  Presents for follow-up visit.? Doing great.? No symptoms of concern.? No abdominal pain, nausea, vomiting, hematemesis, melena, or hematochezia.? We had referred her to?GI for EGD?for evaluation of gastric abnormality noted on restaging scans last year.? She says that she received the call?but declined the procedure.? Good appetite. ?Good energy level. ?No weakness, fatigue, malaise, recurrent fevers,?drenching night sweats,?lymphadenopathy, etc.  ?   09/04/2020:  -08/13/2019: ThinPrep cervical Pap smear: Atypical squamous cells of undetermined significance; high risk HPV positive; high risk HPV 16, 18/25-  -03/11/2020: ThinPrep cervical Pap smear: Negative for intraepithelial malignancy  -09/01/2020: ThinPrep cervical Pap smear: Negative for intraepithelial lesion or malignancy; high risk HPV negative  -01/20/2020: Screening mammogram, bilateral (comparison: 01/17/2019): Bilateral breasts negative (BI-RADS 1)  -08/24/2020: DEXA scan: Lumbar spine normal; osteopenia bilateral  femurs  -08/28/2020: Surveillance CTs C/A/P with contrast (comparison: 08/13/2019): No recurrent lymphoma  -08/28/2020: Surveillance CT soft tissue of the neck with contrast (comparison: 08/13/2019): No evidence of lymphoma  -08/21/2020: CMP unremarkable.  -09/04/2020: CMP unremarkable.? , normal. ?WBC 3.7.? ANC 1.14.? Hemoglobin 13.4.? Platelets 226,000/mm?.? Segmented neutrophils 38%.? Lymphocytes 56%.  Patient interviewed via telemedicine visit. ?She was at home in [place], [place].? Overall:, Doing well except that she feels a bit tired?which she attributes to?being under lockdown secondary to?call with pandemic.? Occasional abdominal pains, transient, not severe, and not associated with nausea, vomiting,?GI bleeding, anorexia, or unintentional weight loss. ?No fevers or chills.? Bowel movements are a bit erratic.? Her last colonoscopy was in May 2016 at [facility]?which showed?colonic diverticulosis.? She has not noticed any blood in stool.? Abdominal pains, on 3-4 times?every few weeks.? Has not noticed any lumps or lymphadenopathy. ?No chest pain, cough, or dyspnea.? No unusual headaches or focal neurological symptoms. ?No recurrent fevers or drenching night sweats.  ?  This is a telemedicine note. Patient was treated using telemedicine, real time audio and video, according to [facility] protocols. I, distant provider, conducted the visit from location identified below. The patient participated in the visit at a non-[facility] location selected by the patient identified below. I am licensed in the state where the patient stated they are located. The patient stated that they understood and accepted the privacy and security risks to their information at their location. Patient was located at her home in [place], [place].  ?   I, distant provider, was located at [facility].  ?   Face to face time spent with patient exceeds?25 minutes, over 50% of which was used for education and counseling regarding medical conditions,  current medications including risk/benefit and side effects/adverse events, over the counter medications-uses/doses, home self-care and contact precautions, and red flags and indications for immediate medical attention. ?The patient is receptive, expresses understanding and is agreeable to plan. All questions answered.  ?   ??  Review of Systems?  ?????12 point review of systems done in full with pertinent positives as described in interval history. Remainder of review of systems unremarkable.?  ???  ??  Physical Examination:  VITAL SIGNS: ?Reviewed. ? ?  GENERAL:? In no apparent distress.?  HEAD:? No signs of head trauma.  EYES:? Pupils are equal.? Extraocular motions intact.?  EARS:? Hearing grossly intact.  MOUTH:? Oropharynx is normal.  NECK:? No adenopathy, no JVD.??  CHEST:? Chest with clear breath sounds bilaterally.? No wheezes, rales, or rhonchi.?  CARDIAC:? Regular rate and rhythm.? S1 and S2, without murmurs, gallops, or rubs.  VASCULAR:? No Edema.? Peripheral pulses normal and equal in all extremities.  ABDOMEN:? Soft, without detectable tenderness.? No sign of distention.? No?? rebound or guarding, and no masses palpated.?? Bowel Sounds normal.  MUSCULOSKELETAL:? Good range of motion of all major joints. Extremities without clubbing, cyanosis or edema.?  NEUROLOGIC EXAM:? Alert and oriented x 3.? No focal sensory or strength deficits.?? Speech normal.? Follows commands.  PSYCHIATRIC:? Mood normal.  SKIN:? No rash or lesions.  09/04/2020:?Not examined; it was a telemedicine visit.  ?  ?   Assessment:?  # Stage III NHL (follicular, grade 1), diagnosed November 2013?  -Status post?R-CHOP?x 6 cycles,?12/2013 - 4/2014  -Status post?maintenance Rituxan?q8 weeks x 12, 9/3/2014 through 5/19/2016  -No recurrence or progression (CTs: 08/2017)  -No recurrence or progression (CTs:?08/06/2018)  -Soft tissue prominence along the greater curvature of distal gastric body and gastric pylorus?(ingested material versus  other pathology)?(CTs: 08/06/2018)  -Screening colonoscopy (05/02/2016):?Moderate diffuse diverticulosis  -DEXA scan (08/20/2018):?Osteopenia of both femurs;?normal BMD lumbar spine;?mild decrease in mean densities?within the lumbar spine and femurs since 2016  -Screening mammogram (01/17/2019):?No malignancy  -No recurrence or progression (CTs: 08/13/2019)  -Stable 1.1 cm?hypodense right thyroid lobe nodule;?questionable soft tissue prominence of stomach?greater curvature and along the anterior aspect of antrum?(CTs: 08/13/2019)  -->?She declined GI evaluation  -No recurrence or progression?(CTs: 08/20/2020)  ?  ?  # Low-grade squamous intraepithelial lesion of cervix  -02/15/2019: ThinPrep cervical Pap smear: Low-grade squamous intraepithelial lesion  -03/19/2019:  1.?LEEP: Focal mild squamous dysplasia (GUILLERMINA I) with associated HPV effect; the dysplasia focally extends to the equal margin  2. ?Endocervical curettage: No atypical or dysplastic cells identified  -06/20/2019: ThinPrep cervical Pap smear: Negative for intraepithelial lesion or malignancy  -08/13/2019: ThinPrep cervical Pap smear: Atypical squamous cells of undetermined significance; high risk HPV positive; high risk HPV 16, 18/45?negative  -03/11/2020: ThinPrep cervical Pap smear: Negative for intraepithelial malignancy  -09/01/2020: ThinPrep cervical Pap smear: Negative for intraepithelial lesion or malignancy; high risk HPV negative  ?  ?  Plan:  09/04/2020: CMP unremarkable.? WBC 3.7.? ANC 1.14.? Hemoglobin 13.4.? Platelets 226,000/mm?.? Segmented neutrophils 38%.? Lymphocytes 56%.  New onset neutropenia, relative neutropenia, relative lymphocytosis.  -->  Will evaluate with flow cytometry of blood (leukemia lymphoma panel)  ?  No recurrence or progression of lymphoma?(CTs:?08/28/2020)  ?  Repeat surveillance CT C/A/P/softness of the neck with contrast in 1 year?(08/2021);  Earlier, if any new or progressive symptoms of concern in the interim  ?  On  surveillance CTs?08/13/2019,?persistent questionable soft tissue prominence of the stomach?greater curvature?and along the anterior aspect of the antrum?is noted.? Similar abnormality was noted?1 year back, on surveillance CTs dated 08/06/2018  -->  She?declined GI consultation and EGD.? She is completely asymptomatic.  ?  09/04/2020: Reports?intermittent, mild, self-limiting abdominal pains?without associated?symptoms; does not wish to desire GI consultation at this time; advised her to?inform us if symptoms worsen?in which case, she will need further evaluation.  ?  Status post?R-CHOP?x 6 cycles,?12/2013 - 04/2014  Completed 5 years of follow-up?by 04/2019.  Continue?annual?follow-up?with labs and surveillance scans; earlier,?as clinically indicated?in case of?concerning symptoms like progressive weakness or fatigue, unintentional weight loss, recurrent fevers, drenching night sweats, enlarging lymph nodes, etc.  ?  Follow-up with GYN?for Pap smear abnormality.  ?  Follow-up visit in 3 weeks, with?flow cytometry of blood?and repeat CBC, CMP, and LDH level  If nothing of concern, then,?follow-up in1 year with CBC, CMP, LDH,?and surveillance CT scans of chest/abdomen/pelvis/soft tissue of the neck with contrast.  ?  Above discussed at length with her.? All questions answered. ?Discussed results of investigations performed in the interim.  She understands and agrees with this plan.??  ------------------  ?  ???????  NCCN guidelines surveillance for follicular lymphoma:  Completed 6 cycles of?R-CHOP?04/2014  Clinical H&P and labs every 3-6 months for 5 years (until 04/2019), then annually or as clinically indicated  Surveillance imaging should be performed whenever there are clinical indications  Surveillance imaging up to 2 years (until 04/2016)?post completion of treatment: C/A/P CT scans with contrast no more than every 6 months  After 2 years?(after 04/2016), no more frequently than annually   Problem List/Past  Medical History  Ongoing  Follicular lymphoma, grade 1  Follicular lymphoma, grade 1  Follicular lymphoma, grade 1  follicular lyphoma  Forearm fracture  Herpes simplex(  Confirmed  )  Lateral meniscus tear  Lymphoma  Neutropenia  Sinus congestion(  Confirmed  )  Historical  Hyperlipidemia  Hypertension  Obesity(  Probable Diagnosis  )  Procedure/Surgical History  82786 - LT CATARACT W/IOL 1 STA PHACO (Left) (02/26/2019)  Extracapsular cataract removal with insertion of intraocular lens prosthesis (1 stage procedure), manual or mechanical technique (eg, irrigation and aspiration or phacoemulsification) (02/26/2019)  Replacement of Left Lens with Synthetic Substitute, Percutaneous Approach (02/26/2019)  Removal impacted cerumen using irrigation/lavage, unilateral (08/17/2018)  Colonoscopy, flexible; diagnostic, including collection of specimen(s) by brushing or washing, when performed (separate procedure) (05/02/2016)  Inspection of Lower Intestinal Tract, Via Natural or Artificial Opening Endoscopic (05/02/2016)  Biopsy of bone marrow (08/14/2014)  Bone Marrow Biopsy (None) (08/14/2014)  Bone marrow; biopsy, needle or trocar. (08/14/2014)  mediport placement (2013)  needle biopsy to neck (2013)  tissue biopsy (2013)  sugery for endometreosis (1980)  mediport removal   Medications  amLODIPine 2.5 mg oral tablet, 2.5 mg= 1 tab(s), Oral, Daily, 6 refills  B6/B12 oral tablet  calcium (as calcium citrate) 250 mg oral tablet  estradiol 0.1 mg/g vaginal cream, VAG, M,F  Glucosamine Chondroitin Advanced oral tablet  Heparin Flush 100 U/mL - 5 mL, 500 units= 5 mL, IV Push, Once-chemo  Heparin Flush 100 U/mL - 5 mL, 500 units= 5 mL, IV Push, Once-chemo  Heparin Flush 100 U/mL - 5 mL, 500 units= 5 mL, IV Push, Once-chemo  Heparin Flush 100 U/mL - 5 mL, 500 units= 5 mL, IV Push, Once-chemo  ibuprofen 200 mg oral tablet, 400 mg= 2 tab(s), Oral, q6hr  montelukast 10 mg oral TABLET, 10 mg= 1 tab(s), Oral, qPM, 11  refills  Multiple Vitamins with Minerals oral tablet  pravastatin 10 mg oral tablet, 10 mg= 1 tab(s), Oral, Daily, 6 refills  Vitamin C  Vitamin D  ZyrTEC, Daily  Allergies  codeine?(Urine codeine screening)  Social History  Abuse/Neglect  No, No, Yes, 2020  Alcohol - Denies Alcohol Use, 2014  Current, 1-2 times per week, 2020  Employment/School  Employed, 2020  Exercise - Occasional exercise, 2014  Exercise type: Denies., 2020  Home/Environment  Lives with Significant other. Living situation: Home/Independent., 2020  Nutrition/Health  Regular, Good, 2019  Sexual - No Risk, 2014  Substance Use - Denies Substance Abuse, 2014  Never, 2020  Tobacco - Denies Tobacco Use, 2014  Never (less than 100 in lifetime), No, 2020  Family History  Breast cancer.: Sister.  : Mother.  Diabetes.: Father.  Heart disease: Father.  Lymphoma: Brother.  Mitral valve disorder.: Sister.  Prostate cancer.: Brother.  Immunizations  Vaccine Date Status Comments   influenza virus vaccine, inactivated 2019 Given    influenza virus vaccine, inactivated 10/15/2018 Given    influenza virus vaccine, inactivated 10/11/2017 Given    influenza virus vaccine, inactivated 10/06/2016 Given    influenza virus vaccine, inactivated 10/12/2015 Given    pneumococcal 23-polyvalent vaccine 2015 Recorded    influenza virus vaccine, inactivated 10/29/2014 Given Nursing Judgment   tetanus-diphtheria toxoids 10/06/2014 Given

## 2022-05-03 NOTE — HISTORICAL [facility] CERNER
This is a historical note converted from [place]. Formatting and pictures may have been removed.  Please reference [place] for original formatting and attached multimedia. History of Present Illness  Reason for follow-up:  -Follicular lymphoma, grade 1, stage III, diagnosed 11/2013; s/p R-CHOP x6, then maintenance Rituxan every 8 weeks x12 (12/2013-05/2016)  -Low-grade squamous intraepithelial lesion of cervix  ?  ???  History of present illness:  ??67-year-old white female with diagnosis of non-Hodgkin lymphoma stage III diagnosed in 11/2013. Patient initially with subsegmental mass, s/p FNA (9/2013) undeterminant and subsequent excisional biopsy 11/2013 congruent with grade 1 small B-cell follicular lymphoma. ?CT Neck/Chest/Abdomen/Pelvis in 10/2013 and subsequent PET CT 12/2013 revealed bulky LAD (largest up to 14 cm, SUV up to 8, both above and below diaphragm. ?Patient had originally deferred BM biopsy, and subsequently received CHOP+Rituxan x 6 cycles, from 12/2013-4/2014. ?Repeat PET after 3 cycles (2/2014) showed interval decrease in disease burden, and repeat PET following treatment (5/2014) was negative for any active/residual disease.?  ??Completed 6 cycles of RCHOP from 12/2013 to 4/2014, followed by Rituxan maintenance q 8 weeks for 12 cycles, 9/3/2014-5/192016  ??Restaging scans on 1/11/16 and August 2016 show no disease, mesenteric stranding  Restaging scans on 8/31/17 did not reveal progression of disease?  ?   ?  Interval History?  ?   10/20/2021:  -09/04/2020: Flow cytometry of blood: No evidence of monoclonality, acute leukemia, or lymphoproliferative disorder  -Follows up with PCP for hypertension, dyslipidemia, osteopenia of both hips  -02/25/2021: Bilateral screening mammogram (comparison: 01/20/2020): Both breast negative (BI-RADS 1)  -09/07/2021: ThinPrep cervical Pap smear: NIL  -09/16/2021: Surveillance CT C/A/P with contrast (comparison: 08/28/2020): No acute process; subcentimeter liver  hypodensity, stable; 1 cm right thyroid lobe hypodensity, stable; no interval change from prior  -09/16/2021: Surveillance CT soft tissue of the neck with contrast (comparison: 08/20/2020): Stable exam; no cervical lymphadenopathy  -09/10/2021: CMP unremarkable  -Screening colonoscopy (05/02/2016): Moderate diffuse diverticulosis; otherwise, normal exam to cecum  Presents for follow-up visit. ?Doing great.? States that she feels?good. ?Good appetite.? Good energy. ?No new lumps or lymphadenopathy.? Apparently, tested positive for Covid in August 2021?after she had received her first?shot of Covid vaccine; apparently, was tested in [place]?emergency department.? Had mild fever.? Did not experience shortness of breath. ?No high fevers.? Apparently, was prescribed?a 4-5 and 5 days course of?oral steroids; apparently,?also received multiple antibiotic infusion.? Tells me that she will get?second shot of cold vaccine?in November, along with flu shot.  ?  ??  Review of Systems?  ?????12 point review of systems done in full with pertinent positives as described in interval history. Remainder of review of systems unremarkable.?  ???  ??  Physical Examination:  VITAL SIGNS: ?Reviewed. ? ?  GENERAL:? In no apparent distress.?  HEAD:? No signs of head trauma.  EYES:? Pupils are equal.? Extraocular motions intact.?  EARS:? Hearing grossly intact.  MOUTH:? Oropharynx is normal.  NECK:? No adenopathy, no JVD.??  CHEST:? Chest with clear breath sounds bilaterally.? No wheezes, rales, or rhonchi.?  CARDIAC:? Regular rate and rhythm.? S1 and S2, without murmurs, gallops, or rubs.  VASCULAR:? No Edema.? Peripheral pulses normal and equal in all extremities.  ABDOMEN:? Soft, without detectable tenderness.? No sign of distention.? No?? rebound or guarding, and no masses palpated.?? Bowel Sounds normal.  MUSCULOSKELETAL:? Good range of motion of all major joints. Extremities without clubbing, cyanosis or edema.?  NEUROLOGIC EXAM:? Alert  and oriented x 3.? No focal sensory or strength deficits.?? Speech normal.? Follows commands.  PSYCHIATRIC:? Mood normal.  SKIN:? No rash or lesions.  09/04/2020:?Not examined; it was a telemedicine visit.  10/20/2021:?Looks well and healthy; exam unremarkable.  ?  ?   Assessment:?  # Stage III NHL (follicular, grade 1), diagnosed November 2013?  -Status post?R-CHOP?x 6 cycles,?12/2013 - 4/2014  -Bone marrow biopsy 08/14/2014  -Status post?maintenance Rituxan?q8 weeks x 12, 9/3/2014 through 5/19/2016  -No recurrence or progression (CTs: 08/2017)  -No recurrence or progression (CTs:?08/06/2018)  -Soft tissue prominence along the greater curvature of distal gastric body and gastric pylorus?(ingested material versus other pathology)?(CTs: 08/06/2018)  -Screening colonoscopy (05/02/2016):?Moderate diffuse diverticulosis  -DEXA scan (08/20/2018):?Osteopenia of both femurs;?normal BMD lumbar spine;?mild decrease in mean densities?within the lumbar spine and femurs since 2016  -Screening mammogram (01/17/2019):?No malignancy  -No recurrence or progression (CTs: 08/13/2019)  -Stable 1.1 cm?hypodense right thyroid lobe nodule;?questionable soft tissue prominence of stomach?greater curvature and along the anterior aspect of antrum?(CTs: 08/13/2019)  >>She declined GI evaluation  -No recurrence or progression?(CTs: 08/20/2020)  -09/04/2020: Flow cytometry of blood: No evidence of monoclonality, acute leukemia, or lymphoproliferative disorder  -No recurrence?on surveillance CT C/A/P/soft tissue of the neck with contrast (09/16/2021)  ?  ?  # Low-grade squamous intraepithelial lesion of cervix  -02/15/2019: ThinPrep cervical Pap smear: Low-grade squamous intraepithelial lesion  -03/19/2019:  1.?LEEP: Focal mild squamous dysplasia (GUILLERMINA I) with associated HPV effect; the dysplasia focally extends to the equal margin  2. ?Endocervical curettage: No atypical or dysplastic cells identified  -06/20/2019: ThinPrep cervical Pap smear:  Negative for intraepithelial lesion or malignancy  -08/13/2019: ThinPrep cervical Pap smear: Atypical squamous cells of undetermined significance; high risk HPV positive; high risk HPV 16, 18/45?negative  -03/11/2020: ThinPrep cervical Pap smear: Negative for intraepithelial malignancy  -09/01/2020: ThinPrep cervical Pap smear: Negative for intraepithelial lesion or malignancy; high risk HPV negative  -09/07/2021: ThinPrep cervical Pap smear: NIL  ?  ?  Plan:  Status post?R-CHOP?x 6 cycles,?12/2013 - 04/2014  Completed 5 years of follow-up?by 04/2019.  Continue?annual?follow-up?with labs and surveillance scans; earlier,?as clinically indicated?if any symptoms of concern in the interim.  ?   -Today, 10/20/2021, check CBC, CMP,?LDH level.  ?   -No progression on surveillance CTs?(09/16/2021)  -Repeat?contrast-enhanced CT scans of C/A/P/soft tissues?of the neck?for surveillance, in 1 year (09/2022)  ?  -Screening colonoscopy (05/02/2016): Moderate diffuse diverticulosis; otherwise, normal exam to cecum  -Next?colonoscopy will be due in 10 years (05/2026)  ?  Follow-up in 1 year (09/2022), with CBC, CMP, LDH, and surveillance CT C/A/P/soft tissues of the neck with contrast?for surveillance; earlier,?if any symptoms of concern in the interim.  ?   Above discussed at length with her.? All questions answered. ?  Discussed results of investigations performed in the interim.  She understands and agrees with this plan.??  ------------------  ?   ???????  NCCN guidelines surveillance for follicular lymphoma:  Completed 6 cycles of?R-CHOP?04/2014  Clinical H&P and labs every 3-6 months for 5 years (until 04/2019), then annually or as clinically indicated  Surveillance imaging should be performed whenever there are clinical indications  Surveillance imaging up to 2 years (until 04/2016)?post completion of treatment: C/A/P CT scans with contrast no more than every 6 months  After 2 years?(after 04/2016), no more frequently than  annually  Physical Exam  Vitals & Measurements  T:?36.7? ?C (Oral)? HR:?99(Peripheral)? RR:?20? BP:?124/78? SpO2:?100%?  HT:?160?cm? WT:?61?kg?   Problem List/Past Medical History  Ongoing  Follicular lymphoma grade I  Follicular lymphoma, grade 1  Follicular lymphoma, grade 1  Follicular lymphoma, grade 1  follicular lyphoma  Forearm fracture  Herpes simplex(  Confirmed  )  Lateral meniscus tear  Lymphoma  Neutropenia  Sinus congestion(  Confirmed  )  Historical  Hyperlipidemia  Hypertension  Obesity(  Probable Diagnosis  )  Procedure/Surgical History  98851 - LT CATARACT W/IOL 1 STA PHACO (Left) (02/26/2019)  Extracapsular cataract removal with insertion of intraocular lens prosthesis (1 stage procedure), manual or mechanical technique (eg, irrigation and aspiration or phacoemulsification) (02/26/2019)  Replacement of Left Lens with Synthetic Substitute, Percutaneous Approach (02/26/2019)  Removal impacted cerumen using irrigation/lavage, unilateral (08/17/2018)  Colonoscopy, flexible; diagnostic, including collection of specimen(s) by brushing or washing, when performed (separate procedure) (05/02/2016)  Inspection of Lower Intestinal Tract, Via Natural or Artificial Opening Endoscopic (05/02/2016)  Biopsy of bone marrow (08/14/2014)  Bone Marrow Biopsy (None) (08/14/2014)  Bone marrow; biopsy, needle or trocar. (08/14/2014)  mediport placement (2013)  needle biopsy to neck (2013)  tissue biopsy (2013)  sugery for endometreosis (1980)  mediport removal   Medications  amLODIPine 2.5 mg oral tablet, 2.5 mg= 1 tab(s), Oral, Daily, 6 refills  B6/B12 oral tablet  calcium (as calcium citrate) 250 mg oral tablet  estradiol 0.1 mg/g vaginal cream, VAG, M,F  Glucosamine Chondroitin Advanced oral tablet  Heparin Flush 100 U/mL - 5 mL, 500 units= 5 mL, IV Push, Once-chemo  Heparin Flush 100 U/mL - 5 mL, 500 units= 5 mL, IV Push, Once-chemo  Heparin Flush 100 U/mL - 5 mL, 500 units= 5 mL, IV Push, Once-chemo  Heparin  Flush 100 U/mL - 5 mL, 500 units= 5 mL, IV Push, Once-chemo  ibuprofen 200 mg oral tablet, 400 mg= 2 tab(s), Oral, q6hr  Multiple Vitamins with Minerals oral tablet  pravastatin 20 mg oral tablet, 20 mg= 1 tab(s), Oral, Daily, 6 refills  Vitamin C  Vitamin D  ZyrTEC, Daily  Allergies  codeine?(Urine codeine screening)  Social History  Abuse/Neglect  No, 2020  Alcohol - Denies Alcohol Use, 2014  Current, 2021  Employment/School  Employed, 2020  Exercise - Occasional exercise, 2014  Exercise duration: 45. Exercise frequency: 3-4 times/week., 2020  Exercise type: Denies., 2020  Home/Environment  Lives with Significant other. Living situation: Home/Independent., 2020  Nutrition/Health  Regular, Good, 2019  Sexual - No Risk, 2014  Sexually active: Yes. Number of current partners 1., 2021  Spiritual/Cultural  Religion, 2020  Substance Use - Denies Substance Abuse, 2014  Never, 10/15/2020  Tobacco - Denies Tobacco Use, 2014  Never (less than 100 in lifetime), No, 2021  Family History  Breast cancer.: Sister.  : Mother.  Diabetes.: Father.  Heart disease: Father.  Lymphoma: Brother.  Mitral valve disorder.: Sister.  Prostate cancer.: Brother.  Immunizations  Vaccine Date Status Comments   influenza virus vaccine, inactivated 2020 Given    influenza virus vaccine, inactivated 2019 Given    influenza virus vaccine, inactivated 10/15/2018 Given    influenza virus vaccine, inactivated 10/11/2017 Given    influenza virus vaccine, inactivated 10/06/2016 Given    influenza virus vaccine, inactivated 10/12/2015 Given    pneumococcal 23-polyvalent vaccine 2015 Recorded    influenza virus vaccine, inactivated 10/29/2014 Given Nursing Judgment   tetanus-diphtheria toxoids 10/06/2014 Given

## 2022-05-03 NOTE — HISTORICAL [facility] CERN[facility]
This is a historical note converted from [place]. Formatting and pictures may have been removed.  Please reference [place] for original formatting and attached multimedia. Chief Complaint  follicular lymphoma, ecog 0, no pain  History of Present Illness  67 year old female with PMH of NHL stage 3 treated with CHOP+R and rituxan maintenance therapy from 09/14 to 05/16, is here today for 6 month followup.? CT scans from last year did not show any residual masses, and mammogram from last year was negative.? Patient states she has been feeling well since last visit, and recently had mediport removed in June 17.? Patient complaining of irritation at the site, with erythema at the site.?Patient denies fever, chills, weight loss, night sweats.  Review of Systems  14 point review of systems negative except for pertinent positives in HPI.  Physical Exam  Vitals & Measurements  T:?36.8? ?C ?(Oral)? HR:?81?(Peripheral)? BP:?156/84? SpO2:?99%?  HT:?161?cm? HT:?161?cm? WT:?56.5?kg? WT:?56.5?kg? BMI:?21.8?  ?  General: ?Alert and oriented, No acute distress. ?  Eye: ?Pupils are equal, round and reactive to light, Extraocular movements are intact. ?  HEENT: Moist oral mucosa, no pharyngeal exudates Supple, Non-tender, No carotid bruit, No jugular venous distention, No lymphadenopathy, No thyromegaly, Negative hepatojugular reflux. ?  Respiratory: ?Lungs are clear to auscultation, Respirations are non-labored, Breath sounds are equal, Symmetrical chest wall expansion, No chest wall tenderness. ?  Cardiovascular: ?Normal rate, Regular rhythm, No murmur, No gallop, Good pulses equal in all extremities, Normal peripheral perfusion, No edema. ?  Gastrointestinal: ?Soft, Non-tender, Non-distended, Normal bowel sounds, No organomegaly. ?  Genitourinary: ?No costovertebral angle tenderness. ?  Musculoskeletal: ?Normal range of motion, Normal strength, No tenderness, No swelling, No deformity, Normal gait. ?  Integumentary: ?Warm, No rash.  ?  Neurologic: ?Alert, Oriented, Normal sensory, Normal motor function, No focal deficits, Cranial Nerves II-XII are grossly intact.  Cognition and Speech: ?Oriented, Speech clear and coherent. ?  Psychiatric: ?Cooperative, Appropriate mood & affect. ?  ?  ?  Assessment/Plan  1.?Follicular lymphoma  ?patient states doing well today.? Denies fever, weight loss, night sweats.  will f/u in 2 weeks  repeat CT scans of chest, abdomen, and soft tissue of neck in 1 week  Ordered:  Clinic Follow up, 09/05/17 10:00:00 CDT, Follicular lymphoma, [facility]  CT Abdomen and Pelvis W W/O Contrast, Routine, 08/31/17 8:30:00 CDT, Other (please specify), Follicular lymphoma, unspecified, unspecified site, None, Ambulatory, Rad Type, Oral Contrast, Order for future visit, Follicular lymphoma, Schedule this test, [facility], 08/...  CT Soft Tissue Neck W W/O Contrast, Routine, 08/31/17 9:00:00 CDT, Other (please specify), Follicular lymphoma, unspecified, unspecified site, None, Ambulatory, Rad Type, Order for future visit, Follicular lymphoma, Schedule this test, [facility], 08/31/17 9:00:00 C...  CT Thorax W W/O Contrast, Routine, 08/31/17 8:30:00 CDT, Other (please specify), Follicular lymphoma, unspecified, unspecified site, None, Ambulatory, Rad Type, Order for future visit, Follicular lymphoma, Schedule this test, [facility], 08/31/17 8:30:00 C...  [facility] Screening, Routine, 08/31/17 7:30:00 CDT, Screening, Follicular lymphoma, unspecified, unspecified site, None, Ambulatory, Rad Type, Order for future visit, Follicular lymphoma, Schedule this test, [facility], 08/31/17 7:30:00 CDT  ?   Problem List/Past Medical History  Follicular lymphoma, grade 1  Follicular lymphoma, grade 1  Follicular lymphoma, grade 1  follicular lyphoma  Forearm fracture  Herpes simplex(  Confirmed  )  Lymphoma  Neutropenia  Obesity(  Probable  Diagnosis  )  Sinus congestion(  Confirmed  )  Historical  No historical problems  Procedure/Surgical History  Colonoscopy, flexible; diagnostic, including collection of specimen(s) by brushing or washing, when performed (separate procedure) (05/02/2016), Inspection of Lower Intestinal Tract, Via Natural or Artificial Opening Endoscopic (05/02/2016), Biopsy of bone marrow (08/14/2014), Bone Marrow Biopsy (None) (08/14/2014), Bone marrow; biopsy, needle or trocar. (08/14/2014), mediport placement (2013), needle biopsy to neck (2013), tissue biopsy (2013), sugery for endometreosis (1980), mediport removal.  Medications  Acidophilus Probiotic Blend  Aleve 220 mg oral capsule, 440 mg, 2 cap(s), Oral, Once,? ?Not taking  B6/B12 oral tablet  calcium (as calcium citrate) 250 mg oral tablet  Glucosamine Chondroitin Advanced oral tablet  Heparin Flush 100 U/mL - 5 mL, 500 units, 5 mL, IV Push, Once-chemo  Heparin Flush 100 U/mL - 5 mL, 500 units, 5 mL, IV Push, Once-chemo  Heparin Flush 100 U/mL - 5 mL, 500 units, 5 mL, IV Push, Once-chemo  Heparin Flush 100 U/mL - 5 mL, 500 units, 5 mL, IV Push, Once-chemo  ibuprofen 200 mg oral tablet, 400 mg, 2 tab(s), Oral, q6hr  Multiple Vitamins with Minerals oral tablet  Vitamin C  Vitamin D  Allergies  codeine?(Urine codeine screening)  Social History  Alcohol - Denies Alcohol Use  Current, Liquor, 1-2 times per month  Employment/School  Employed, Highest education level: University degree(s).  Employed  Exercise - Occasional exercise  Self assessment: Excellent condition. Exercise type: DANCING.  Home/Environment  Lives with Alone. Living situation: Home/Independent. Alcohol abuse in household: No. Substance abuse in household: No. Smoker in household: No. Injuries/Abuse/Neglect in household: No. Feels unsafe at home: No. Safe place to go: Yes. Family/Friends available for support: Yes.  Lives with Alone.  Nutrition/Health  Regular  Regular  Sexual - No Risk  Substance Abuse -  Denies Substance Abuse  Never  Tobacco - Denies Tobacco Use  Never smoker  Family History  Breast cancer.: Sister.  Diabetes.: Father.  Heart disease: Father.  Prostate cancer.: Brother.      Patient seen and examined in conjunction with the resident. ?I actively participated in all aspects of [facility] patient encounter along with the resident, and agree with the assessment and plan as outlined above.  ?  Finished maintenance Rituxan?for stage III non-Hodgkins lymphoma, small B-cell follicular type, grade 1, in May 2016.? Doing well at this time,?with no symptoms or signs of concern digestive of recurrence.? Recent abnormal Pap smear is noted.? Surveillance CT scans in August 2016 were negative for lymphoma recurrence.  ?  In terms of surveillance,?needs to be followed every 3-6 months for a total of 5 years, and then annually.? CT scans of chest abdomen and pelvis for surveillance?not more frequently than every 6 months?in the initial 2 years posttreatment;?then, not more frequently than annually?after?2 years posttreatment.? Will get contrast enhanced CT scans of chest abdomen pelvis and soft tissues of the neck?now for surveillance,?and see her back for a follow-up visit in a couple of weeks.  ?  ?

## 2022-06-13 ENCOUNTER — TELEPHONE (OUTPATIENT)
Dept: GYNECOLOGY | Facility: CLINIC | Age: 72
End: 2022-06-13
Payer: MEDICARE

## 2022-06-13 NOTE — TELEPHONE ENCOUNTER
Patient is former Dr Birch patient, she has no upcoming appts and is requesting to be seen for Cataract Surgery Clearance. Please contact patient and advise Dr Birch is no longer here and she will need new PCP to help accommodate her clearance. Thank you

## 2022-10-27 ENCOUNTER — OFFICE VISIT (OUTPATIENT)
Dept: FAMILY MEDICINE | Facility: CLINIC | Age: 72
End: 2022-10-27
Payer: MEDICARE

## 2022-10-27 VITALS
HEIGHT: 63 IN | DIASTOLIC BLOOD PRESSURE: 79 MMHG | TEMPERATURE: 98 F | RESPIRATION RATE: 18 BRPM | SYSTOLIC BLOOD PRESSURE: 126 MMHG | HEART RATE: 87 BPM | WEIGHT: 138.25 LBS | BODY MASS INDEX: 24.5 KG/M2

## 2022-10-27 DIAGNOSIS — M85.80 OSTEOPENIA, UNSPECIFIED LOCATION: ICD-10-CM

## 2022-10-27 DIAGNOSIS — Z23 IMMUNIZATION DUE: Primary | ICD-10-CM

## 2022-10-27 DIAGNOSIS — M81.0 AGE-RELATED OSTEOPOROSIS WITHOUT CURRENT PATHOLOGICAL FRACTURE: ICD-10-CM

## 2022-10-27 DIAGNOSIS — Z85.72 HISTORY OF LYMPHOMA: ICD-10-CM

## 2022-10-27 DIAGNOSIS — I10 HYPERTENSION, UNSPECIFIED TYPE: ICD-10-CM

## 2022-10-27 DIAGNOSIS — E78.5 HYPERLIPIDEMIA, UNSPECIFIED HYPERLIPIDEMIA TYPE: ICD-10-CM

## 2022-10-27 PROCEDURE — G0008 ADMIN INFLUENZA VIRUS VAC: HCPCS | Mod: PBBFAC

## 2022-10-27 PROCEDURE — 99214 PR OFFICE/OUTPT VISIT, EST, LEVL IV, 30-39 MIN: ICD-10-PCS | Mod: S$PBB,,, | Performed by: FAMILY MEDICINE

## 2022-10-27 PROCEDURE — 99214 OFFICE O/P EST MOD 30 MIN: CPT | Mod: S$PBB,,, | Performed by: FAMILY MEDICINE

## 2022-10-27 PROCEDURE — 99214 OFFICE O/P EST MOD 30 MIN: CPT | Mod: PBBFAC | Performed by: FAMILY MEDICINE

## 2022-10-27 RX ORDER — AMLODIPINE BESYLATE 2.5 MG/1
2.5 TABLET ORAL DAILY
Qty: 90 TABLET | Refills: 1 | Status: SHIPPED | OUTPATIENT
Start: 2022-10-27

## 2022-10-27 RX ORDER — PRAVASTATIN SODIUM 20 MG/1
20 TABLET ORAL DAILY
Qty: 90 TABLET | Refills: 1 | Status: SHIPPED | OUTPATIENT
Start: 2022-10-27

## 2022-10-27 RX ORDER — MULTIVITAMIN
1 TABLET ORAL DAILY
COMMUNITY

## 2022-10-27 NOTE — PROGRESS NOTES
Stacey Eugene  10/27/2022  [id number]                  Chief Complaint:  Chief Complaint   Patient presents with    [facility]       History of Present Illness:  Previously followed with Dr. Birch  Has been out of pravastatin  Feels well  Would like DEXA repeat  Has Mammogram orders from Dr. Warren/GYN      History:  Past Medical History:   Diagnosis Date    HLD (hyperlipidemia)     Hypertension     Lymphoma, follicular     Unspecified cataract      Past Surgical History:   Procedure Laterality Date    EXTRACTION OF CATARACT       History reviewed. No pertinent family history.  Social History     Socioeconomic History    Marital status:    Tobacco Use    Smoking status: Never    Smokeless tobacco: Never   Substance and Sexual Activity    Alcohol use: Yes    Drug use: Never     There is no problem list on file for this patient.    Review of patient's allergies indicates:   Allergen Reactions    Codeine          Medications:  Current Outpatient Medications on File Prior to Visit   Medication Sig Dispense Refill    ascorbic acid, vitamin C, (VITAMIN C) 500 MG tablet Take 500 mg by mouth once daily.      calcium-vitamin D3-vitamin K 650 mg-12.5 mcg-40 mcg Chew Take by mouth.      estradioL (VAGIFEM) 10 mcg Tab Place vaginally.      glucosamine HCl (GLUCOSAMINE, BULK, MISC) by Misc.(Non-Drug; Combo Route) route.      multivitamin (THERAGRAN) per tablet Take 1 tablet by mouth once daily.      zinc gluconate 50 mg tablet Take 50 mg by mouth once daily.      [DISCONTINUED] amLODIPine (NORVASC) 2.5 MG tablet Take 2.5 mg by mouth once daily.      [DISCONTINUED] pravastatin (PRAVACHOL) 20 MG tablet Take 20 mg by mouth once daily.       No current facility-administered medications on file prior to visit.       Medications have been reviewed and reconciled with patient at this visit.    Adverse reactions to current medications reviewed with patient..      Exam:  Wt Readings from Last 3 Encounters:   10/27/22 62.7 kg  (138 lb 3.7 oz)   07/05/22 60.8 kg (134 lb)   01/19/22 61.9 kg (136 lb 7.4 oz)     Temp Readings from Last 3 Encounters:   10/27/22 97.9 °F (36.6 °C)   07/14/22 97.2 °F (36.2 °C) (Temporal)     BP Readings from Last 3 Encounters:   10/27/22 126/79   07/14/22 135/69   01/19/22 114/73     Pulse Readings from Last 3 Encounters:   10/27/22 87   07/14/22 96     Body mass index is 24.49 kg/m².      ROS:  See HPI for details    Constitutional: Denies Change in appetite. Denies Chills. Denies Fever. Denies Night sweats.  Eye: Denies Blurred vision. Denies Discharge. Denies Eye pain.  ENT: Denies Decreased hearing. Denies Sore throat. Denies Swollen glands.  Respiratory: Denies Cough. Denies Shortness of breath. Denies Shortness of breath with exertion. Denies Wheezing.  Cardiovascular: Denies Chest pain at rest. Denies Chest pain with exertion. Denies Irregular heartbeat. Denies Palpitations.  Gastrointestinal: Denies Abdominal pain. Denies Diarrhea. Denies Nausea. Denies Vomiting. Denies Hematemesis or Hematochezia.  Genitourinary: Denies Dysuria. Denies Urinary frequency. Denies Urinary urgency. Denies Blood in urine.  Endocrine: Denies Cold intolerance. Denies Excessive thirst. Denies Heat intolerance. Denies Weight loss. Denies Weight gain.  Musculoskeletal: Denies Painful joints. Denies Weakness.  Integumentary: Denies Rash. Denies Itching. Denies Dry skin.  Neurologic: Denies Dizziness. Denies Fainting. Denies Headache.  Psychiatric: Denies Depression. Denies Anxiety. Denies Suicidal/Homicidal ideations.    All Other ROS: Negative except as stated in HPI.    PE:  General: Alert and oriented, No acute distress.  Head: Normocephalic, Atraumatic.  Eye: Pupils are equal, round and reactive to light, Extraocular movements are intact, Sclera non-icteric.  Ears/Nose/Throat: Normal, Mucosa moist,Clear.  Neck/Thyroid: Supple, Non-tender, No carotid bruit, No palpable thyromegaly or thyroid nodule, No lymphadenopathy, No JVD,  Full range of motion.  Respiratory: Clear to auscultation bilaterally; No wheezes, rales or rhonchi,Non-labored respirations, Symmetrical chest wall expansion.  Cardiovascular: Regular rate and rhythm, S1/S2 normal, No murmurs, rubs or gallops.  Gastrointestinal: Soft, Non-tender, Non-distended, Normal bowel sounds, No palpable organomegaly.  Musculoskeletal: Normal range of motion.  Integumentary: Warm, Dry, Intact, No suspicious lesions or rashes.  Extremities: No clubbing, cyanosis or edema    Laboratory Reviewed ({Yes)  Lab Results   Component Value Date    WBC 4.6 10/20/2021    HGB 13.1 10/20/2021    HCT 39.6 10/20/2021     10/20/2021    CHOL 223 04/20/2022    TRIG 121 04/20/2022    HDL 65 04/20/2022    ALT 18 04/20/2022    AST 22 04/20/2022     04/20/2022    K 4.5 04/20/2022    CREATININE 0.83 04/20/2022    BUN 17.3 04/20/2022    CO2 30 04/20/2022    TSH 6.350 (H) 08/06/2018       Stacey was seen today for establish care.    Diagnoses and all orders for this visit:    Immunization due  -     Influenza (FLUAD) - Quadrivalent (Adjuvanted) *Preferred* (65+) (PF)    Osteopenia, unspecified location    Age-related osteoporosis without current pathological fracture    Hypertension, unspecified type    Hyperlipidemia, unspecified hyperlipidemia type    History of lymphoma    Other orders  -     amLODIPine (NORVASC) 2.5 MG tablet; Take 1 tablet (2.5 mg total) by mouth once daily.  -     pravastatin (PRAVACHOL) 20 MG tablet; Take 1 tablet (20 mg total) by mouth once daily.    Medications refilled  Repeat DEXA ordered   Continue weightbearing exercises, calcium,vitamin D  Has mammogram orders  Will need labs before next visit  Flu shot today            Care Plan/Goals: Reviewed    Goals    None         Follow up: No follow-ups on file.

## 2022-11-02 DIAGNOSIS — Z12.31 SCREENING MAMMOGRAM FOR HIGH-RISK PATIENT: Primary | ICD-10-CM

## 2022-11-28 DIAGNOSIS — C82.91 NODULAR LYMPHOMA OF LYMPH NODES OF HEAD, FACE AND NECK: Primary | ICD-10-CM

## 2022-11-29 ENCOUNTER — HOSPITAL ENCOUNTER (OUTPATIENT)
Dept: RADIOLOGY | Facility: HOSPITAL | Age: 72
Discharge: HOME | End: 2022-11-29
Attending: OBSTETRICS & GYNECOLOGY
Payer: MEDICARE

## 2022-11-29 ENCOUNTER — TELEPHONE (OUTPATIENT)
Dept: FAMILY MEDICINE | Facility: CLINIC | Age: 72
End: 2022-11-29
Payer: MEDICARE

## 2022-11-29 ENCOUNTER — HOSPITAL ENCOUNTER (OUTPATIENT)
Dept: RADIOLOGY | Facility: HOSPITAL | Age: 72
Discharge: HOME | End: 2022-11-29
Attending: FAMILY MEDICINE
Payer: MEDICARE

## 2022-11-29 DIAGNOSIS — M81.0 AGE-RELATED OSTEOPOROSIS WITHOUT CURRENT PATHOLOGICAL FRACTURE: ICD-10-CM

## 2022-11-29 DIAGNOSIS — M85.80 OSTEOPENIA, UNSPECIFIED LOCATION: ICD-10-CM

## 2022-11-29 DIAGNOSIS — Z12.31 SCREENING MAMMOGRAM FOR HIGH-RISK PATIENT: ICD-10-CM

## 2022-11-29 PROBLEM — Z85.72 ENCOUNTER FOR FOLLOW-UP SURVEILLANCE OF LYMPHOMA: Status: ACTIVE | Noted: 2022-11-29

## 2022-11-29 PROBLEM — Z08 ENCOUNTER FOR FOLLOW-UP SURVEILLANCE OF LYMPHOMA: Status: ACTIVE | Noted: 2022-11-29

## 2022-11-29 PROBLEM — Z85.72 HISTORY OF FOLLICULAR LYMPHOMA: Status: ACTIVE | Noted: 2022-11-29

## 2022-11-29 PROCEDURE — 77063 BREAST TOMOSYNTHESIS BI: CPT | Mod: 26,,, | Performed by: RADIOLOGY

## 2022-11-29 PROCEDURE — 77063 MAMMO DIGITAL SCREENING BILAT WITH TOMO: ICD-10-PCS | Mod: 26,,, | Performed by: RADIOLOGY

## 2022-11-29 PROCEDURE — 77067 SCR MAMMO BI INCL CAD: CPT | Mod: 26,,, | Performed by: RADIOLOGY

## 2022-11-29 PROCEDURE — 77067 MAMMO DIGITAL SCREENING BILAT WITH TOMO: ICD-10-PCS | Mod: 26,,, | Performed by: RADIOLOGY

## 2022-11-29 PROCEDURE — 77063 BREAST TOMOSYNTHESIS BI: CPT | Mod: TC

## 2022-11-29 PROCEDURE — 77080 DXA BONE DENSITY AXIAL: CPT | Mod: TC

## 2022-11-29 NOTE — TELEPHONE ENCOUNTER
Please let patient know:    DEXA scan shows osteopenia. Continue OTC calcium/vitamin D  supplements.

## 2022-12-13 ENCOUNTER — PATIENT OUTREACH (OUTPATIENT)
Dept: ADMINISTRATIVE | Facility: HOSPITAL | Age: 72
End: 2022-12-13
Payer: MEDICARE

## 2022-12-13 ENCOUNTER — HOSPITAL ENCOUNTER (OUTPATIENT)
Dept: RADIOLOGY | Facility: HOSPITAL | Age: 72
Discharge: HOME | End: 2022-12-13
Attending: INTERNAL MEDICINE
Payer: MEDICARE

## 2022-12-13 DIAGNOSIS — C82.91 NODULAR LYMPHOMA OF LYMPH NODES OF HEAD, FACE AND NECK: ICD-10-CM

## 2022-12-13 LAB
CREAT SERPL-MCNC: 0.83 MG/DL (ref 0.55–1.02)
GFR SERPLBLD CREATININE-BSD FMLA CKD-EPI: >60 MLS/MIN/1.73/M2

## 2022-12-13 PROCEDURE — 82565 ASSAY OF CREATININE: CPT | Performed by: INTERNAL MEDICINE

## 2022-12-13 PROCEDURE — 36415 COLL VENOUS BLD VENIPUNCTURE: CPT | Performed by: INTERNAL MEDICINE

## 2022-12-13 PROCEDURE — 71270 CT THORAX DX C-/C+: CPT | Mod: TC

## 2022-12-13 PROCEDURE — 70492 CT SFT TSUE NCK W/O & W/DYE: CPT | Mod: TC

## 2022-12-13 PROCEDURE — 25500020 PHARM REV CODE 255: Performed by: INTERNAL MEDICINE

## 2022-12-13 PROCEDURE — 74178 CT ABD&PLV WO CNTR FLWD CNTR: CPT | Mod: TC

## 2022-12-13 RX ADMIN — IOPAMIDOL 100 ML: 755 INJECTION, SOLUTION INTRAVENOUS at 09:12

## 2022-12-19 PROBLEM — R59.0 MEDIASTINAL LYMPHADENOPATHY: Status: ACTIVE | Noted: 2022-12-19

## 2022-12-20 ENCOUNTER — OFFICE VISIT (OUTPATIENT)
Dept: HEMATOLOGY/ONCOLOGY | Facility: CLINIC | Age: 72
End: 2022-12-20
Payer: MEDICARE

## 2022-12-20 VITALS
RESPIRATION RATE: 20 BRPM | WEIGHT: 137.81 LBS | OXYGEN SATURATION: 98 % | TEMPERATURE: 98 F | BODY MASS INDEX: 24.42 KG/M2 | HEIGHT: 63 IN | SYSTOLIC BLOOD PRESSURE: 139 MMHG | HEART RATE: 103 BPM | DIASTOLIC BLOOD PRESSURE: 86 MMHG

## 2022-12-20 DIAGNOSIS — E83.52 HYPERCALCEMIA: ICD-10-CM

## 2022-12-20 DIAGNOSIS — R59.0 MEDIASTINAL LYMPHADENOPATHY: ICD-10-CM

## 2022-12-20 DIAGNOSIS — Z85.72 HISTORY OF FOLLICULAR LYMPHOMA: Primary | ICD-10-CM

## 2022-12-20 DIAGNOSIS — Z08 ENCOUNTER FOR FOLLOW-UP SURVEILLANCE OF LYMPHOMA: ICD-10-CM

## 2022-12-20 DIAGNOSIS — Z85.72 ENCOUNTER FOR FOLLOW-UP SURVEILLANCE OF LYMPHOMA: ICD-10-CM

## 2022-12-20 PROCEDURE — 99214 PR OFFICE/OUTPT VISIT, EST, LEVL IV, 30-39 MIN: ICD-10-PCS | Mod: S$PBB,,, | Performed by: INTERNAL MEDICINE

## 2022-12-20 PROCEDURE — 99214 OFFICE O/P EST MOD 30 MIN: CPT | Mod: PBBFAC | Performed by: INTERNAL MEDICINE

## 2022-12-20 PROCEDURE — 99214 OFFICE O/P EST MOD 30 MIN: CPT | Mod: S$PBB,,, | Performed by: INTERNAL MEDICINE

## 2022-12-20 NOTE — PROGRESS NOTES
History:  Past Medical History:   Diagnosis Date    HLD (hyperlipidemia)     Hypertension     Lymphoma, follicular     Unspecified cataract        Past Surgical History:   Procedure Laterality Date    EXTRACTION OF CATARACT        Social History     Socioeconomic History    Marital status:    Tobacco Use    Smoking status: Never    Smokeless tobacco: Never   Substance and Sexual Activity    Alcohol use: Yes    Drug use: Never      History reviewed. No pertinent family history.     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   67-year-old white female with diagnosis of non-Hodgkin lymphoma stage III diagnosed in 11/2013. Patient initially with subsegmental mass, s/p FNA (9/2013) undeterminant and subsequent excisional biopsy 11/2013 congruent with grade 1 small B-cell follicular lymphoma.  CT Neck/Chest/Abdomen/Pelvis in 10/2013 and subsequent PET CT 12/2013 revealed bulky LAD (largest up to 14 cm, SUV up to 8, both above and below diaphragm.  Patient had originally deferred BM biopsy, and subsequently received CHOP+Rituxan x 6 cycles, from 12/2013-4/2014.  Repeat PET after 3 cycles (2/2014) showed interval decrease in disease burden, and repeat PET following treatment (5/2014) was negative for any active/residual disease.      Completed 6 cycles of RCHOP from 12/2013 to 4/2014, followed by Rituxan maintenance q 8 weeks for 12 cycles, 9/3/2014-5/192016     Restaging scans on 1/11/16 and August 2016 show no disease, mesenteric stranding   Restaging scans on 8/31/17 did not reveal progression of disease     Interval History:  [No matching plan found]   [No matching plan found]   12/20/2022:  -11/29/2022:  Bilateral  screening mammogram with tomosynthesis: Results pending  -11/29/2022: DEXA scan: Normal BMD of lumbar vertebrae; BMD of femurs consistent with osteopenia  -no showed 11/30/2022  -11/29/2022:  Bilateral screening mammogram with tomosynthesis (comparison:  02/25/2021 mammogram):  Both breasts negative, BI-RADS 1  -12/13/2022: CT C/A/P with contrast for surveillance (comparison:  09/16/2021):  Interval development of some subcarinal lymph nodes for which short-term follow-up is recommended (largest lymph node subcarinal 1.1 x 1.9 cm, this was not present on prior examination; and subcarinal lymph node 1 cm x 1 cm, also new)  Stable punctate liver cyst   Stable benign area of calcification adjacent to the right kidney   Stable right-sided thyroid nodule  -12/13/2022: Surveillance CT soft tissues of the neck with contrast (comparison:  CT neck 09/16/2021):  No cervical lymphadenopathy  Labs reviewed.    Presents for a follow-up visit.  Doing well.  No weakness or fatigue.  Good appetite.  No fevers, chills, or drenching night sweats.  No new lumps or lymphadenopathy.  ECOG 0.  Found to have mild hypercalcemia.  Drinks 2 glasses of milk daily.  Also, eats lots of cheese and ice cream.  Also choose a calcium tablet every day; has been taking calcium supplements for several months/years.  Advised her to cut back on dairy; will recheck calcium level in 2 weeks.      Medications:  Current Outpatient Medications on File Prior to Visit   Medication Sig Dispense Refill    amLODIPine (NORVASC) 2.5 MG tablet Take 1 tablet (2.5 mg total) by mouth once daily. 90 tablet 1    ascorbic acid, vitamin C, (VITAMIN C) 500 MG tablet Take 500 mg by mouth once daily.      calcium-vitamin D3-vitamin K 650 mg-12.5 mcg-40 mcg Chew Take by mouth.      estradioL (VAGIFEM) 10 mcg Tab Place vaginally.      glucosamine HCl (GLUCOSAMINE, BULK, MISC) by Misc.(Non-Drug; Combo Route) route.      multivitamin (THERAGRAN) per tablet Take 1 tablet by mouth  once daily.      pravastatin (PRAVACHOL) 20 MG tablet Take 1 tablet (20 mg total) by mouth once daily. 90 tablet 1    zinc gluconate 50 mg tablet Take 50 mg by mouth once daily.       No current facility-administered medications on file prior to visit.       Review of Systems:   All systems reviewed and found to be negative except for the symptoms detailed above    Physical Examination:   VITAL SIGNS:   Vitals:    12/20/22 1509   BP: 139/86   Pulse: 103   Resp: 20   Temp: 98.1 °F (36.7 °C)     GENERAL:  In no apparent distress.    HEAD:  No signs of head trauma.  EYES:  Pupils are equal.  Extraocular motions intact.    EARS:  Hearing grossly intact.  MOUTH:  Oropharynx is normal.   NECK:  No adenopathy, no JVD.     CHEST:  Chest with clear breath sounds bilaterally.  No wheezes, rales, rhonchi.    CARDIAC:  Regular rate and rhythm.  S1 and S2, without murmurs, gallops, rubs.  VASCULAR:  No Edema.  Peripheral pulses normal and equal in all extremities.  ABDOMEN:  Soft, without detectable tenderness.  No sign of distention.  No   rebound or guarding, and no masses palpated.   Bowel Sounds normal.  MUSCULOSKELETAL:  Good range of motion of all major joints. Extremities without clubbing, cyanosis or edema.    NEUROLOGIC EXAM:  Alert and oriented x 3.  No focal sensory or strength deficits.   Speech normal.  Follows commands.  PSYCHIATRIC:  Mood normal.    No results for input(s): CBC in the last 72 hours.   No results for input(s): CMP in the last 72 hours.     Assessment:  Problem List Items Addressed This Visit          Oncology    History of follicular lymphoma - Primary    Relevant Orders    Comprehensive Metabolic Panel    Calcium, Ionized    CT Chest Abdomen Pelvis W W/O Contrast (XPD)    CT Soft Tissue Neck W WO Contrast    Encounter for follow-up surveillance of lymphoma    Relevant Orders    Comprehensive Metabolic Panel    Calcium, Ionized    CT Chest Abdomen Pelvis W W/O Contrast (XPD)    CT Soft Tissue Neck  W WO Contrast       Other    Mediastinal lymphadenopathy    Relevant Orders    Comprehensive Metabolic Panel    Calcium, Ionized    CT Chest Abdomen Pelvis W W/O Contrast (XPD)    CT Soft Tissue Neck W WO Contrast     Other Visit Diagnoses       Hypercalcemia        Relevant Orders    Comprehensive Metabolic Panel    Calcium, Ionized    CT Chest Abdomen Pelvis W W/O Contrast (XPD)    CT Soft Tissue Neck W WO Contrast        # Stage III NHL (follicular, grade 1), diagnosed November 2013:   -Status post R-CHOP x 6 cycles, 12/2013 - 4/2014  -Bone marrow biopsy 08/14/2014  -Status post maintenance Rituxan q8 weeks x 12, 9/3/2014 through 5/19/2016   -No recurrence or progression (CTs: 08/2017)   -No recurrence or progression (CTs: 08/06/2018)   -Soft tissue prominence along the greater curvature of distal gastric body and gastric pylorus (ingested material versus other pathology) (CTs: 08/06/2018)   -Screening colonoscopy (05/02/2016): Moderate diffuse diverticulosis   -DEXA scan (08/20/2018): Osteopenia of both femurs; normal BMD lumbar spine; mild decrease in mean densities within the lumbar spine and femurs since 2016   -Screening mammogram (01/17/2019): No malignancy   -No recurrence or progression (CTs: 08/13/2019)   -Stable 1.1 cm hypodense right thyroid lobe nodule; questionable soft tissue prominence of stomach greater curvature and along the anterior aspect of antrum (CTs: 08/13/2019)   >>She declined GI evaluation   -No recurrence or progression (CTs: 08/20/2020)   -09/04/2020: Flow cytometry of blood: No evidence of monoclonality, acute leukemia, or lymphoproliferative disorder   -No recurrence on surveillance CT C/A/P/soft tissue of the neck with contrast (09/16/2021)  -surveillance CTs C/A/P/neck 12/13/2022:  Interval development of some subcarinal lymph nodes, largest 1.9 cm      # Low-grade squamous intraepithelial lesion of cervix:  -02/15/2019: ThinPrep cervical Pap smear: Low-grade squamous  intraepithelial lesion   -03/19/2019:   1. LEEP: Focal mild squamous dysplasia (GUILLERMINA I) with associated HPV effect; the dysplasia focally extends to the equal margin   2.  Endocervical curettage: No atypical or dysplastic cells identified   -06/20/2019: ThinPrep cervical Pap smear: Negative for intraepithelial lesion or malignancy   -08/13/2019: ThinPrep cervical Pap smear: Atypical squamous cells of undetermined significance; high risk HPV positive; high risk HPV 16, 18/45 negative   -03/11/2020: ThinPrep cervical Pap smear: Negative for intraepithelial malignancy   -09/01/2020: ThinPrep cervical Pap smear: Negative for intraepithelial lesion or malignancy; high risk HPV negative   -09/07/2021: ThinPrep cervical Pap smear: NIL        Plan:  Status post R-CHOP x 6 cycles, 12/2013 - 04/2014  Completed 5 years of follow-up 04/2019.  Continue annual follow-up with labs and surveillance scans; earlier, as clinically indicated if any symptoms of concern in the interim.     -No progression on surveillance CTs (09/16/2021)  -surveillance CTs C/A/P/neck 12/13/2022:  Interval development of some subcarinal lymph nodes, largest 1.9 cm  >>>  Short-term follow-up recommended   Will evaluate with contrast enhanced CT C/A/P/neck in 3 months (mid March 2023)  If no concerns on CTs in March 2023, then, will continue with annual surveillance CTs    12/20/2022:  Today, calcium level is slightly high (calcium level 11.0; albumin 4.4)  Recheck CMP and ionized calcium level in 2 weeks, then follow-up with nurse practitioner.  Drinks 2 glasses of milk daily; each lots of cheese and ice cream; has been chewing 1 calcium tablet daily for several months/years; advised her to cut back on dairy for next 2 weeks before we recheck her calcium level.  Also advised her to stop taking calcium tablet for now.    -11/29/2022:  Bilateral screening mammogram with tomosynthesis (comparison:  02/25/2021 mammogram):  Both breasts negative, BI-RADS  1  >>>  Repeat screening mammogram in 1 year (11/2023)      -Screening colonoscopy (05/02/2016): Moderate diffuse diverticulosis; otherwise, normal exam to cecum   -Next colonoscopy will be due in 10 years (05/2026)    Follow-up with NP in 2 weeks, with CMP and ionized calcium level.  Follow-up in March 2023, with CTs, CBC, CMP, and LDH     Above discussed at length with her.  All questions answered.     Discussed results of investigations performed in the interim.   She understands and agrees with this plan.     ------------------               NCCN guidelines: Surveillance for follicular lymphoma:  Completed 6 cycles of R-CHOP 04/2014  Clinical H&P and labs every 3-6 months for 5 years (until 04/2019), then annually or as clinically indicated   Surveillance imaging should be performed whenever there are clinical indications   Surveillance imaging up to 2 years (until 04/2016) post completion of treatment: C/A/P CT scans with contrast no more than every 6 months   After 2 years (after 04/2016), no more frequently than annually          Follow-up:  No follow-ups on file.

## 2022-12-20 NOTE — Clinical Note
Orders for today:   In mid March, contrast enhanced CT scans of C/A/P/neck to follow-up on subcarinal lymphadenopathy noted on CTs dated 12/13/2022  Today, calcium level is slightly high Recheck CMP and ionized calcium level in 2 weeks, then follow-up with nurse practitioner.    Screening mammogram in November 2023

## 2023-01-03 ENCOUNTER — OFFICE VISIT (OUTPATIENT)
Dept: HEMATOLOGY/ONCOLOGY | Facility: CLINIC | Age: 73
End: 2023-01-03
Payer: MEDICARE

## 2023-01-03 VITALS
BODY MASS INDEX: 24.45 KG/M2 | SYSTOLIC BLOOD PRESSURE: 128 MMHG | HEIGHT: 63 IN | RESPIRATION RATE: 20 BRPM | TEMPERATURE: 97 F | WEIGHT: 138 LBS | OXYGEN SATURATION: 96 % | HEART RATE: 92 BPM | DIASTOLIC BLOOD PRESSURE: 83 MMHG

## 2023-01-03 DIAGNOSIS — E83.52 HYPERCALCEMIA: Primary | ICD-10-CM

## 2023-01-03 DIAGNOSIS — Z85.72 HISTORY OF FOLLICULAR LYMPHOMA: ICD-10-CM

## 2023-01-03 PROCEDURE — 99213 PR OFFICE/OUTPT VISIT, EST, LEVL III, 20-29 MIN: ICD-10-PCS | Mod: S$PBB,,,

## 2023-01-03 PROCEDURE — 99214 OFFICE O/P EST MOD 30 MIN: CPT | Mod: PBBFAC

## 2023-01-03 PROCEDURE — 99213 OFFICE O/P EST LOW 20 MIN: CPT | Mod: S$PBB,,,

## 2023-01-03 NOTE — PROGRESS NOTES
History:  Past Medical History:   Diagnosis Date    HLD (hyperlipidemia)     Hypertension     Lymphoma, follicular     Unspecified cataract        Past Surgical History:   Procedure Laterality Date    EXTRACTION OF CATARACT        Social History     Socioeconomic History    Marital status:    Tobacco Use    Smoking status: Never    Smokeless tobacco: Never   Substance and Sexual Activity    Alcohol use: Yes    Drug use: Never      History reviewed. No pertinent family history.     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   67-year-old white female with diagnosis of non-Hodgkin lymphoma stage III diagnosed in 11/2013. Patient initially with subsegmental mass, s/p FNA (9/2013) undeterminant and subsequent excisional biopsy 11/2013 congruent with grade 1 small B-cell follicular lymphoma.  CT Neck/Chest/Abdomen/Pelvis in 10/2013 and subsequent PET CT 12/2013 revealed bulky LAD (largest up to 14 cm, SUV up to 8, both above and below diaphragm.  Patient had originally deferred BM biopsy, and subsequently received CHOP+Rituxan x 6 cycles, from 12/2013-4/2014.  Repeat PET after 3 cycles (2/2014) showed interval decrease in disease burden, and repeat PET following treatment (5/2014) was negative for any active/residual disease.      Completed 6 cycles of RCHOP from 12/2013 to 4/2014, followed by Rituxan maintenance q 8 weeks for 12 cycles, 9/3/2014-5/192016     Restaging scans on 1/11/16 and August 2016 show no disease, mesenteric stranding   Restaging scans on 8/31/17 did not reveal progression of disease     Interval History:     12/20/2022:  -11/29/2022:  Bilateral screening mammogram with tomosynthesis: Results  pending  -11/29/2022: DEXA scan: Normal BMD of lumbar vertebrae; BMD of femurs consistent with osteopenia  -no showed 11/30/2022  -11/29/2022:  Bilateral screening mammogram with tomosynthesis (comparison:  02/25/2021 mammogram):  Both breasts negative, BI-RADS 1  -12/13/2022: CT C/A/P with contrast for surveillance (comparison:  09/16/2021):  Interval development of some subcarinal lymph nodes for which short-term follow-up is recommended (largest lymph node subcarinal 1.1 x 1.9 cm, this was not present on prior examination; and subcarinal lymph node 1 cm x 1 cm, also new)  Stable punctate liver cyst   Stable benign area of calcification adjacent to the right kidney   Stable right-sided thyroid nodule  -12/13/2022: Surveillance CT soft tissues of the neck with contrast (comparison:  CT neck 09/16/2021):  No cervical lymphadenopathy  Labs reviewed.    Presents for a follow-up visit.  Doing well.  No weakness or fatigue.  Good appetite.  No fevers, chills, or drenching night sweats.  No new lumps or lymphadenopathy.  ECOG 0.  Found to have mild hypercalcemia.  Drinks 2 glasses of milk daily.  Also, eats lots of cheese and ice cream.  Also choose a calcium tablet every day; has been taking calcium supplements for several months/years.  Advised her to cut back on dairy; will recheck calcium level in 2 weeks.      1/3/2023:  Presents today for follow up of hypercalcemia.  She reports discontinuing calcium and vitamin D supplements since her last visit.  She has not decreased her dietary calcium intake as she is concerned with her condition of osteopenia and wants to ensure she has enough intake of calcium.  Advised ok to decrease some of her dietary intake.  She would rather intake dietary calcium vs. supplements as it is easier for her to continue dietary intake. Advised to discontinue calcium supplements and restart vitamin D. She is amenable.  After reviewing labs calcium level has improved. She denies any other  issues or concerns at this time.    Medications:  Current Outpatient Medications on File Prior to Visit   Medication Sig Dispense Refill    amLODIPine (NORVASC) 2.5 MG tablet Take 1 tablet (2.5 mg total) by mouth once daily. 90 tablet 1    ascorbic acid, vitamin C, (VITAMIN C) 500 MG tablet Take 500 mg by mouth once daily.      calcium-vitamin D3-vitamin K 650 mg-12.5 mcg-40 mcg Chew Take by mouth.      estradioL (VAGIFEM) 10 mcg Tab Place vaginally.      glucosamine HCl (GLUCOSAMINE, BULK, MISC) by Misc.(Non-Drug; Combo Route) route.      multivitamin (THERAGRAN) per tablet Take 1 tablet by mouth once daily.      pravastatin (PRAVACHOL) 20 MG tablet Take 1 tablet (20 mg total) by mouth once daily. 90 tablet 1    zinc gluconate 50 mg tablet Take 50 mg by mouth once daily.       No current facility-administered medications on file prior to visit.       Review of Systems:   All systems reviewed and found to be negative except for the symptoms detailed above    Physical Examination:   VITAL SIGNS:   Vitals:    01/03/23 1444   BP: 128/83   Pulse: 92   Resp: 20   Temp: 97.2 °F (36.2 °C)     GENERAL:  In no apparent distress.    HEAD:  No signs of head trauma.  EYES:  Pupils are equal.  Extraocular motions intact.    EARS:  Hearing grossly intact.  MOUTH:  Oropharynx is normal.   NECK:  No adenopathy, no JVD.     CHEST:  Chest with clear breath sounds bilaterally.  No wheezes, rales, rhonchi.    CARDIAC:  Regular rate and rhythm.  S1 and S2, without murmurs, gallops, rubs.  VASCULAR:  No Edema.  Peripheral pulses normal and equal in all extremities.  ABDOMEN:  Soft, without detectable tenderness.  No sign of distention.  No   rebound or guarding, and no masses palpated.   Bowel Sounds normal.  MUSCULOSKELETAL:  Good range of motion of all major joints. Extremities without clubbing, cyanosis or edema.    NEUROLOGIC EXAM:  Alert and oriented x 3.  No focal sensory or strength deficits.   Speech normal.  Follows  commands.  PSYCHIATRIC:  Mood normal.        Assessment:  Problem List Items Addressed This Visit          Renal/    Hypercalcemia - Primary   # Stage III NHL (follicular, grade 1), diagnosed November 2013:   -Status post R-CHOP x 6 cycles, 12/2013 - 4/2014  -Bone marrow biopsy 08/14/2014  -Status post maintenance Rituxan q8 weeks x 12, 9/3/2014 through 5/19/2016   -No recurrence or progression (CTs: 08/2017)   -No recurrence or progression (CTs: 08/06/2018)   -Soft tissue prominence along the greater curvature of distal gastric body and gastric pylorus (ingested material versus other pathology) (CTs: 08/06/2018)   -Screening colonoscopy (05/02/2016): Moderate diffuse diverticulosis   -DEXA scan (08/20/2018): Osteopenia of both femurs; normal BMD lumbar spine; mild decrease in mean densities within the lumbar spine and femurs since 2016   -Screening mammogram (01/17/2019): No malignancy   -No recurrence or progression (CTs: 08/13/2019)   -Stable 1.1 cm hypodense right thyroid lobe nodule; questionable soft tissue prominence of stomach greater curvature and along the anterior aspect of antrum (CTs: 08/13/2019)   >>She declined GI evaluation   -No recurrence or progression (CTs: 08/20/2020)   -09/04/2020: Flow cytometry of blood: No evidence of monoclonality, acute leukemia, or lymphoproliferative disorder   -No recurrence on surveillance CT C/A/P/soft tissue of the neck with contrast (09/16/2021)  -surveillance CTs C/A/P/neck 12/13/2022:  Interval development of some subcarinal lymph nodes, largest 1.9 cm      # Low-grade squamous intraepithelial lesion of cervix:  -02/15/2019: ThinPrep cervical Pap smear: Low-grade squamous intraepithelial lesion   -03/19/2019:   1. LEEP: Focal mild squamous dysplasia (GUILLERMINA I) with associated HPV effect; the dysplasia focally extends to the equal margin   2.  Endocervical curettage: No atypical or dysplastic cells identified   -06/20/2019: ThinPrep cervical Pap smear: Negative for  intraepithelial lesion or malignancy   -08/13/2019: ThinPrep cervical Pap smear: Atypical squamous cells of undetermined significance; high risk HPV positive; high risk HPV 16, 18/45 negative   -03/11/2020: ThinPrep cervical Pap smear: Negative for intraepithelial malignancy   -09/01/2020: ThinPrep cervical Pap smear: Negative for intraepithelial lesion or malignancy; high risk HPV negative   -09/07/2021: ThinPrep cervical Pap smear: NIL        Plan:  Status post R-CHOP x 6 cycles, 12/2013 - 04/2014  Completed 5 years of follow-up 04/2019.  Continue annual follow-up with labs and surveillance scans; earlier, as clinically indicated if any symptoms of concern in the interim.     -No progression on surveillance CTs (09/16/2021)  -surveillance CTs C/A/P/neck 12/13/2022:  Interval development of some subcarinal lymph nodes, largest 1.9 cm  >>>  -Short-term follow-up recommended   -Will evaluate with contrast enhanced CT C/A/P/neck in 3 months (mid March 2023) scheduled for 3/15/23  -If no concerns on CTs in March 2023, then, will continue with annual surveillance CTs    -1/3/2023  Today, calcium level is improved (calcium level 10.1; albumin 4.2)    -Drinks 2 glasses of milk daily; each lots of cheese and ice cream; has been chewing 1 calcium tablet daily for several months/years; advised her to decrease dietary intake, however she would rather stop her calcium supplement and continue dietary calcium intake as it would be an easier transition; Advised should not do both dietary and oral calcium supplements; as calcium level would increase      -Restart Vitamin D    -RTC; repeat labs and MD for scan review (3/20/23)      -11/29/2022:  Bilateral screening mammogram with tomosynthesis (comparison:  02/25/2021 mammogram):  Both breasts negative, BI-RADS 1  >>>  Repeat screening mammogram in 1 year (11/2023)      -Screening colonoscopy (05/02/2016): Moderate diffuse diverticulosis; otherwise, normal exam to cecum   -Next  colonoscopy will be due in 10 years (05/2026)    Labs reviewed; Hypercalcemia with improvement  Above discussed at length with her.  All questions answered.     Discussed results of investigations performed in the interim.   She understands and agrees with this plan.     ------------------               NCCN guidelines: Surveillance for follicular lymphoma:  Completed 6 cycles of R-CHOP 04/2014  Clinical H&P and labs every 3-6 months for 5 years (until 04/2019), then annually or as clinically indicated   Surveillance imaging should be performed whenever there are clinical indications   Surveillance imaging up to 2 years (until 04/2016) post completion of treatment: C/A/P CT scans with contrast no more than every 6 months   After 2 years (after 04/2016), no more frequently than annually

## 2023-03-06 PROBLEM — Z08 ENCOUNTER FOR FOLLOW-UP SURVEILLANCE OF LYMPHOMA: Status: RESOLVED | Noted: 2022-11-29 | Resolved: 2023-03-06

## 2023-03-06 PROBLEM — Z85.72 ENCOUNTER FOR FOLLOW-UP SURVEILLANCE OF LYMPHOMA: Status: RESOLVED | Noted: 2022-11-29 | Resolved: 2023-03-06

## 2023-03-15 ENCOUNTER — HOSPITAL ENCOUNTER (OUTPATIENT)
Dept: RADIOLOGY | Facility: HOSPITAL | Age: 73
Discharge: HOME | End: 2023-03-15
Attending: INTERNAL MEDICINE
Payer: MEDICARE

## 2023-03-15 DIAGNOSIS — Z08 ENCOUNTER FOR FOLLOW-UP SURVEILLANCE OF LYMPHOMA: ICD-10-CM

## 2023-03-15 DIAGNOSIS — E83.52 HYPERCALCEMIA: ICD-10-CM

## 2023-03-15 DIAGNOSIS — Z85.72 ENCOUNTER FOR FOLLOW-UP SURVEILLANCE OF LYMPHOMA: ICD-10-CM

## 2023-03-15 DIAGNOSIS — Z85.72 HISTORY OF FOLLICULAR LYMPHOMA: ICD-10-CM

## 2023-03-15 DIAGNOSIS — R59.0 MEDIASTINAL LYMPHADENOPATHY: ICD-10-CM

## 2023-03-15 LAB
CREAT SERPL-MCNC: 0.91 MG/DL (ref 0.55–1.02)
GFR SERPLBLD CREATININE-BSD FMLA CKD-EPI: >60 MLS/MIN/1.73/M2

## 2023-03-15 PROCEDURE — 70492 CT SFT TSUE NCK W/O & W/DYE: CPT | Mod: TC

## 2023-03-15 PROCEDURE — 25500020 PHARM REV CODE 255

## 2023-03-15 PROCEDURE — A9698 NON-RAD CONTRAST MATERIALNOC: HCPCS

## 2023-03-15 PROCEDURE — 71270 CT THORAX DX C-/C+: CPT | Mod: TC

## 2023-03-15 PROCEDURE — 82565 ASSAY OF CREATININE: CPT | Performed by: INTERNAL MEDICINE

## 2023-03-15 RX ADMIN — IOHEXOL 100 ML: 350 INJECTION, SOLUTION INTRAVENOUS at 08:03

## 2023-03-15 RX ADMIN — BARIUM SULFATE 450 ML: 20 SUSPENSION ORAL at 08:03

## 2023-03-22 DIAGNOSIS — E83.52 HYPERCALCEMIA: ICD-10-CM

## 2023-03-22 DIAGNOSIS — R59.0 MEDIASTINAL LYMPHADENOPATHY: ICD-10-CM

## 2023-03-22 DIAGNOSIS — Z85.72 HISTORY OF FOLLICULAR LYMPHOMA: Primary | ICD-10-CM

## 2023-03-23 ENCOUNTER — OFFICE VISIT (OUTPATIENT)
Dept: HEMATOLOGY/ONCOLOGY | Facility: CLINIC | Age: 73
End: 2023-03-23
Attending: INTERNAL MEDICINE
Payer: MEDICARE

## 2023-03-23 VITALS
SYSTOLIC BLOOD PRESSURE: 126 MMHG | OXYGEN SATURATION: 97 % | HEART RATE: 105 BPM | HEIGHT: 63 IN | BODY MASS INDEX: 24.98 KG/M2 | TEMPERATURE: 98 F | RESPIRATION RATE: 20 BRPM | DIASTOLIC BLOOD PRESSURE: 83 MMHG | WEIGHT: 141 LBS

## 2023-03-23 DIAGNOSIS — E83.52 HYPERCALCEMIA: Primary | ICD-10-CM

## 2023-03-23 DIAGNOSIS — Z12.31 OTHER SCREENING MAMMOGRAM: ICD-10-CM

## 2023-03-23 DIAGNOSIS — C82.91 NODULAR LYMPHOMA OF LYMPH NODES OF HEAD, FACE AND NECK: ICD-10-CM

## 2023-03-23 DIAGNOSIS — Z85.72 HISTORY OF FOLLICULAR LYMPHOMA: Primary | ICD-10-CM

## 2023-03-23 DIAGNOSIS — R59.0 MEDIASTINAL LYMPHADENOPATHY: ICD-10-CM

## 2023-03-23 DIAGNOSIS — E83.52 HYPERCALCEMIA: ICD-10-CM

## 2023-03-23 DIAGNOSIS — Z85.72 HISTORY OF FOLLICULAR LYMPHOMA: ICD-10-CM

## 2023-03-23 PROCEDURE — 99213 OFFICE O/P EST LOW 20 MIN: CPT | Mod: S$PBB,,, | Performed by: INTERNAL MEDICINE

## 2023-03-23 PROCEDURE — 99213 PR OFFICE/OUTPT VISIT, EST, LEVL III, 20-29 MIN: ICD-10-PCS | Mod: S$PBB,,, | Performed by: INTERNAL MEDICINE

## 2023-03-23 PROCEDURE — 99214 OFFICE O/P EST MOD 30 MIN: CPT | Mod: PBBFAC | Performed by: INTERNAL MEDICINE

## 2023-03-23 RX ORDER — UBIDECARENONE 75 MG
CAPSULE ORAL
COMMUNITY

## 2023-03-23 RX ORDER — CHOLECALCIFEROL (VITAMIN D3) 125 MCG
CAPSULE ORAL
COMMUNITY

## 2023-03-23 RX ORDER — MV/FA/DHA/EPA/FISH OIL/SAW/GNK 400MCG-200
COMBINATION PACKAGE (EA) ORAL
COMMUNITY

## 2023-03-23 RX ORDER — ACETAMINOPHEN 500 MG
TABLET ORAL
COMMUNITY

## 2023-03-23 NOTE — PROGRESS NOTES
History:  Past Medical History:   Diagnosis Date    HLD (hyperlipidemia)     Hypertension     Lymphoma, follicular     Unspecified cataract        Past Surgical History:   Procedure Laterality Date    EXTRACTION OF CATARACT        Social History     Socioeconomic History    Marital status:    Tobacco Use    Smoking status: Never    Smokeless tobacco: Never   Substance and Sexual Activity    Alcohol use: Yes    Drug use: Never      History reviewed. No pertinent family history.     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   67-year-old white female with diagnosis of non-Hodgkin lymphoma stage III diagnosed in 11/2013. Patient initially with subsegmental mass, s/p FNA (9/2013) undeterminant and subsequent excisional biopsy 11/2013 congruent with grade 1 small B-cell follicular lymphoma.  CT Neck/Chest/Abdomen/Pelvis in 10/2013 and subsequent PET CT 12/2013 revealed bulky LAD (largest up to 14 cm, SUV up to 8, both above and below diaphragm.  Patient had originally deferred BM biopsy, and subsequently received CHOP+Rituxan x 6 cycles, from 12/2013-4/2014.  Repeat PET after 3 cycles (2/2014) showed interval decrease in disease burden, and repeat PET following treatment (5/2014) was negative for any active/residual disease.      Completed 6 cycles of RCHOP from 12/2013 to 4/2014, followed by Rituxan maintenance q 8 weeks for 12 cycles, 9/3/2014-5/192016     Restaging scans on 1/11/16 and August 2016 show no disease, mesenteric stranding   Restaging scans on 8/31/17 did not reveal progression of disease     Interval History:  [No matching plan found]   [No matching plan found]   03/23/2023:   -12/20/2022:  Calcium 11.0.   Albumin 4.4.  -01/03/2023:  Calcium 10.1.  Albumin 4.2.  Ionized calcium level 5.11 mg/dL, normal.  -03/15/2023: Restaging CTs C/A/P with contrast (comparison:  12/13/2022): No lymphadenopathy identified.  No detrimental change appreciated since December.  -03/15/2023: Restaging CT soft tissue of the neck with contrast (comparison:  12/13/2022): No abnormality of the soft tissues of the neck.    Presents for follow-up visit.  No symptoms.  Feels well.      Medications:  Current Outpatient Medications on File Prior to Visit   Medication Sig Dispense Refill    amLODIPine (NORVASC) 2.5 MG tablet Take 1 tablet (2.5 mg total) by mouth once daily. 90 tablet 1    ascorbic acid, vitamin C, (VITAMIN C) 500 MG tablet Take 500 mg by mouth once daily.      calcium-vitamin D3-vitamin K 650 mg-12.5 mcg-40 mcg Chew Take by mouth.      cholecalciferol, vitamin D3, (D3-5000) 125 mcg (5,000 unit) capsule       cyanocobalamin (B-12 DOTS) 500 MCG tablet       estradioL (VAGIFEM) 10 mcg Tab Place vaginally.      glucosamine HCl (GLUCOSAMINE, BULK, MISC) by Misc.(Non-Drug; Combo Route) route.      krill oil 500 mg Cap       Lactobacillus acidophilus (PROBIOTIC) 10 billion cell Cap       multivitamin (THERAGRAN) per tablet Take 1 tablet by mouth once daily.      pravastatin (PRAVACHOL) 20 MG tablet Take 1 tablet (20 mg total) by mouth once daily. 90 tablet 1    zinc gluconate 50 mg tablet Take 50 mg by mouth once daily.       No current facility-administered medications on file prior to visit.       Review of Systems:   All systems reviewed and found to be negative except for the symptoms detailed above    Physical Examination:   VITAL SIGNS:   Vitals:    03/23/23 1121   BP: 126/83   Pulse: 105   Resp: 20   Temp: 97.7 °F (36.5 °C)       GENERAL:  In no apparent distress.    HEAD:  No signs of head trauma.  EYES:  Pupils are equal.  Extraocular motions intact.    EARS:  Hearing grossly intact.  MOUTH:  Oropharynx is normal.   NECK:  No  adenopathy, no JVD.     CHEST:  Chest with clear breath sounds bilaterally.  No wheezes, rales, rhonchi.    CARDIAC:  Regular rate and rhythm.  S1 and S2, without murmurs, gallops, rubs.  VASCULAR:  No Edema.  Peripheral pulses normal and equal in all extremities.  ABDOMEN:  Soft, without detectable tenderness.  No sign of distention.  No   rebound or guarding, and no masses palpated.   Bowel Sounds normal.  MUSCULOSKELETAL:  Good range of motion of all major joints. Extremities without clubbing, cyanosis or edema.    NEUROLOGIC EXAM:  Alert and oriented x 3.  No focal sensory or strength deficits.   Speech normal.  Follows commands.  PSYCHIATRIC:  Mood normal.    No results for input(s): CBC in the last 72 hours.   No results for input(s): CMP in the last 72 hours.     Assessment:  Problem List Items Addressed This Visit          Renal/    Hypercalcemia       Oncology    History of follicular lymphoma - Primary       Other    Mediastinal lymphadenopathy     Stage III NHL (follicular, grade 1), diagnosed November 2013:   -Status post R-CHOP x 6 cycles, 12/2013 - 4/2014  -Bone marrow biopsy 08/14/2014  -Status post maintenance Rituxan q8 weeks x 12, 9/3/2014 through 5/19/2016   -No recurrence or progression (CTs: 08/2017)   -No recurrence or progression (CTs: 08/06/2018)   -Soft tissue prominence along the greater curvature of distal gastric body and gastric pylorus (ingested material versus other pathology) (CTs: 08/06/2018)   -Screening colonoscopy (05/02/2016): Moderate diffuse diverticulosis   -DEXA scan (08/20/2018): Osteopenia of both femurs; normal BMD lumbar spine; mild decrease in mean densities within the lumbar spine and femurs since 2016   -Screening mammogram (01/17/2019): No malignancy   -No recurrence or progression (CTs: 08/13/2019)   -Stable 1.1 cm hypodense right thyroid lobe nodule; questionable soft tissue prominence of stomach greater curvature and along the anterior aspect of antrum (CTs:  08/13/2019)   >>She declined GI evaluation   -No recurrence or progression (CTs: 08/20/2020)   -09/04/2020: Flow cytometry of blood: No evidence of monoclonality, acute leukemia, or lymphoproliferative disorder   -No recurrence on surveillance CT C/A/P/soft tissue of the neck with contrast (09/16/2021)  -surveillance CTs C/A/P/neck 12/13/2022:  Interval development of some subcarinal lymph nodes, largest 1.9 cm  -no lymphadenopathy or disease progression on restaging CTs C/A/P/neck 03/15/2023       Low-grade squamous intraepithelial lesion of cervix:  -02/15/2019: ThinPrep cervical Pap smear: Low-grade squamous intraepithelial lesion   -03/19/2019:   1. LEEP: Focal mild squamous dysplasia (GUILLERMINA I) with associated HPV effect; the dysplasia focally extends to the equal margin   2.  Endocervical curettage: No atypical or dysplastic cells identified   -06/20/2019: ThinPrep cervical Pap smear: Negative for intraepithelial lesion or malignancy   -08/13/2019: ThinPrep cervical Pap smear: Atypical squamous cells of undetermined significance; high risk HPV positive; high risk HPV 16, 18/45 negative   -03/11/2020: ThinPrep cervical Pap smear: Negative for intraepithelial malignancy   -09/01/2020: ThinPrep cervical Pap smear: Negative for intraepithelial lesion or malignancy; high risk HPV negative   -09/07/2021: ThinPrep cervical Pap smear: NIL        Plan:  Status post R-CHOP x 6 cycles, 12/2013 - 04/2014  Completed 5 years of follow-up 04/2019.  Continue annual follow-up with labs and surveillance scans; earlier, as clinically indicated if any symptoms of concern in the interim.     -No progression on surveillance CTs (09/16/2021)  -surveillance CTs C/A/P/neck 12/13/2022:  Interval development of some subcarinal lymph nodes, largest 1.9 cm  -no lymphadenopathy or disease progression on restaging CTs C/A/P/neck 03/15/2023   >>>  Continue annual surveillance  Surveillance CT scans of C/A/P/soft tissues of the neck with  contrast in 1 year (03/2024)    12/20/2022:  Today, calcium level is slightly high (calcium level 11.0; albumin 4.4)  Recheck CMP and ionized calcium level in 2 weeks, then follow-up with nurse practitioner.  Drinks 2 glasses of milk daily; each lots of cheese and ice cream; has been chewing 1 calcium tablet daily for several months/years; advised her to cut back on dairy for next 2 weeks before we recheck her calcium level.  Also advised her to stop taking calcium tablet for now  >>>  Calcium level normal 01/03/2023    -11/29/2022:  Bilateral screening mammogram with tomosynthesis (comparison:  02/25/2021 mammogram):  Both breasts negative, BI-RADS 1  >>>  Repeat screening mammogram in 1 year (11/2023)      -Screening colonoscopy (05/02/2016): Moderate diffuse diverticulosis; otherwise, normal exam to cecum   -Next colonoscopy will be due in 10 years (05/2026)    Follow-up in 1 year (03/2024), with surveillance CTs C/A/P/soft tissue neck with contrast, CBC, CMP, and LDH level; earlier, if any concerns in the interim.     Above discussed at length with her.  All questions answered.    Discussed results of investigations performed in the interim.  She understands and agrees with this plan.     ------------------               NCCN guidelines: Surveillance for follicular lymphoma:  Completed 6 cycles of R-CHOP 04/2014  Clinical H&P and labs every 3-6 months for 5 years (until 04/2019), then annually or as clinically indicated   Surveillance imaging should be performed whenever there are clinical indications   Surveillance imaging up to 2 years (until 04/2016) post completion of treatment: C/A/P CT scans with contrast no more than every 6 months   After 2 years (after 04/2016), no more frequently than annually          Follow-up:  No follow-ups on file.

## 2023-12-01 ENCOUNTER — HOSPITAL ENCOUNTER (OUTPATIENT)
Dept: RADIOLOGY | Facility: HOSPITAL | Age: 73
Discharge: HOME | End: 2023-12-01
Attending: INTERNAL MEDICINE
Payer: MEDICARE

## 2023-12-01 DIAGNOSIS — Z85.72 HISTORY OF FOLLICULAR LYMPHOMA: ICD-10-CM

## 2023-12-01 DIAGNOSIS — Z12.31 OTHER SCREENING MAMMOGRAM: ICD-10-CM

## 2023-12-01 DIAGNOSIS — C82.91 NODULAR LYMPHOMA OF LYMPH NODES OF HEAD, FACE AND NECK: ICD-10-CM

## 2023-12-01 PROCEDURE — 77067 MAMMO DIGITAL SCREENING BILAT WITH TOMO: ICD-10-PCS | Mod: 26,,, | Performed by: RADIOLOGY

## 2023-12-01 PROCEDURE — 77063 BREAST TOMOSYNTHESIS BI: CPT | Mod: 26,,, | Performed by: RADIOLOGY

## 2023-12-01 PROCEDURE — 77063 MAMMO DIGITAL SCREENING BILAT WITH TOMO: ICD-10-PCS | Mod: 26,,, | Performed by: RADIOLOGY

## 2023-12-01 PROCEDURE — 77067 SCR MAMMO BI INCL CAD: CPT | Mod: 26,,, | Performed by: RADIOLOGY

## 2023-12-01 PROCEDURE — 77067 SCR MAMMO BI INCL CAD: CPT | Mod: TC

## 2024-03-15 ENCOUNTER — HOSPITAL ENCOUNTER (OUTPATIENT)
Dept: RADIOLOGY | Facility: HOSPITAL | Age: 74
Discharge: HOME | End: 2024-03-15
Attending: INTERNAL MEDICINE
Payer: MEDICARE

## 2024-03-15 DIAGNOSIS — C82.91 NODULAR LYMPHOMA OF LYMPH NODES OF HEAD, FACE AND NECK: ICD-10-CM

## 2024-03-15 DIAGNOSIS — R59.0 MEDIASTINAL LYMPHADENOPATHY: ICD-10-CM

## 2024-03-15 DIAGNOSIS — Z85.72 HISTORY OF FOLLICULAR LYMPHOMA: ICD-10-CM

## 2024-03-15 DIAGNOSIS — Z12.31 OTHER SCREENING MAMMOGRAM: ICD-10-CM

## 2024-03-15 DIAGNOSIS — E83.52 HYPERCALCEMIA: ICD-10-CM

## 2024-03-15 LAB
CREAT SERPL-MCNC: 0.85 MG/DL (ref 0.55–1.02)
GFR SERPLBLD CREATININE-BSD FMLA CKD-EPI: >60 MLS/MIN/1.73/M2

## 2024-03-15 PROCEDURE — 74177 CT ABD & PELVIS W/CONTRAST: CPT | Mod: TC

## 2024-03-15 PROCEDURE — 25500020 PHARM REV CODE 255

## 2024-03-15 PROCEDURE — 82565 ASSAY OF CREATININE: CPT | Performed by: INTERNAL MEDICINE

## 2024-03-15 PROCEDURE — 70492 CT SFT TSUE NCK W/O & W/DYE: CPT | Mod: TC

## 2024-03-15 RX ADMIN — IOHEXOL 100 ML: 350 INJECTION, SOLUTION INTRAVENOUS at 07:03

## 2024-03-22 ENCOUNTER — OFFICE VISIT (OUTPATIENT)
Dept: HEMATOLOGY/ONCOLOGY | Facility: CLINIC | Age: 74
End: 2024-03-22
Attending: INTERNAL MEDICINE
Payer: MEDICARE

## 2024-03-22 VITALS
RESPIRATION RATE: 18 BRPM | BODY MASS INDEX: 24.56 KG/M2 | WEIGHT: 138.63 LBS | HEART RATE: 111 BPM | HEIGHT: 63 IN | DIASTOLIC BLOOD PRESSURE: 69 MMHG | SYSTOLIC BLOOD PRESSURE: 121 MMHG | TEMPERATURE: 98 F | OXYGEN SATURATION: 96 %

## 2024-03-22 DIAGNOSIS — Z12.31 OTHER SCREENING MAMMOGRAM: ICD-10-CM

## 2024-03-22 DIAGNOSIS — R59.0 MEDIASTINAL LYMPHADENOPATHY: ICD-10-CM

## 2024-03-22 DIAGNOSIS — C82.91 NODULAR LYMPHOMA OF LYMPH NODES OF HEAD, FACE AND NECK: ICD-10-CM

## 2024-03-22 DIAGNOSIS — Z85.72 HISTORY OF FOLLICULAR LYMPHOMA: Primary | ICD-10-CM

## 2024-03-22 PROCEDURE — 99214 OFFICE O/P EST MOD 30 MIN: CPT | Mod: PBBFAC | Performed by: INTERNAL MEDICINE

## 2024-03-22 PROCEDURE — 99214 OFFICE O/P EST MOD 30 MIN: CPT | Mod: S$PBB,,, | Performed by: INTERNAL MEDICINE

## 2024-03-22 NOTE — Clinical Note
Continue surveillance  Contrast-enhanced CT scans of C/A/P/neck for surveillance in March 2025 Repeat screening mammogram in November 2023; overdue Follow-up in 1 year with surveillance CTs, CBC, CMP, LDH.

## 2024-03-22 NOTE — PROGRESS NOTES
History:  Past Medical History:   Diagnosis Date    HLD (hyperlipidemia)     Hypertension     Lymphoma, follicular     Unspecified cataract        Past Surgical History:   Procedure Laterality Date    EXTRACTION OF CATARACT        Social History     Socioeconomic History    Marital status:    Tobacco Use    Smoking status: Never    Smokeless tobacco: Never   Substance and Sexual Activity    Alcohol use: Yes    Drug use: Never      History reviewed. No pertinent family history.     Reason for Follow-up:  -Follicular lymphoma, grade 1, stage III, diagnosed 11/2013; s/p R-CHOP x6, then maintenance Rituxan every 8 weeks x12 (12/2013-05/2016)  -Low-grade squamous intraepithelial lesion of cervix       History of Present Illness:   History of follicular lymphoma        Oncologic/Hematologic History:  Oncology History    No history exists.   67-year-old white female with diagnosis of non-Hodgkin lymphoma stage III diagnosed in 11/2013. Patient initially with subsegmental mass, s/p FNA (9/2013) undeterminant and subsequent excisional biopsy 11/2013 congruent with grade 1 small B-cell follicular lymphoma.  CT Neck/Chest/Abdomen/Pelvis in 10/2013 and subsequent PET CT 12/2013 revealed bulky LAD (largest up to 14 cm, SUV up to 8, both above and below diaphragm.  Patient had originally deferred BM biopsy, and subsequently received CHOP+Rituxan x 6 cycles, from 12/2013-4/2014.  Repeat PET after 3 cycles (2/2014) showed interval decrease in disease burden, and repeat PET following treatment (5/2014) was negative for any active/residual disease.   Completed 6 cycles of RCHOP from 12/2013 to 4/2014, followed by Rituxan maintenance q 8 weeks for 12 cycles, 9/3/2014-5/192016  Restaging scans on 1/11/16 and August 2016 show no disease, mesenteric stranding  Restaging scans on 8/31/17 did not reveal progression of disease     Interval History:  [No matching plan found]   [No matching plan found]   03/22/2024:   -03/15/2024:   Surveillance CT soft tissues of the neck with contrast (comparison:  CT neck 03/15/2023):  No cervical lymphadenopathy  -03/15/2024: Surveillance CTs C/A/P with contrast (comparison:  03/15/2023):  No evidence of malignancy C/A/P  -12/02/2023:  Bilateral screening mammogram (comparison:  11/29/2002 mammogram, etc.):  Both breasts negative (BI-RADS 1)  -03/22/2024:  Labs reviewed:  WBC 3.25; hemoglobin 13 5; platelets 223; ANC 1.6, stable; CMP normal; LDH normal  Presents for a follow-up visit.  Doing well.  No complaints.  Mild cough.  No unusual headaches, focal neurological symptoms, vision impairment, chest pain, cough, dyspnea, hemoptysis, unusual bleeding or bruising in any form, recurrent fevers/drenching night sweats/anorexia/unintentional weight loss, any new lumps or lymphadenopathy, abdominal pain, nausea, vomiting, change in bowel habits, GI bleeding, bone pains, etc..  ECOG 0.      Medications:  Current Outpatient Medications on File Prior to Visit   Medication Sig Dispense Refill    amLODIPine (NORVASC) 2.5 MG tablet Take 1 tablet (2.5 mg total) by mouth once daily. 90 tablet 1    ascorbic acid, vitamin C, (VITAMIN C) 500 MG tablet Take 500 mg by mouth once daily.      calcium-vitamin D3-vitamin K 650 mg-12.5 mcg-40 mcg Chew Take by mouth.      cholecalciferol, vitamin D3, (D3-5000) 125 mcg (5,000 unit) capsule       cyanocobalamin (B-12 DOTS) 500 MCG tablet       estradioL (VAGIFEM) 10 mcg Tab Place vaginally.      glucosamine HCl (GLUCOSAMINE, BULK, MISC) by Misc.(Non-Drug; Combo Route) route.      krill oil 500 mg Cap       Lactobacillus acidophilus (PROBIOTIC) 10 billion cell Cap       multivitamin (THERAGRAN) per tablet Take 1 tablet by mouth once daily.      pravastatin (PRAVACHOL) 20 MG tablet Take 1 tablet (20 mg total) by mouth once daily. 90 tablet 1    zinc gluconate 50 mg tablet Take 50 mg by mouth once daily.       No current facility-administered medications on file prior to visit.  "      Review of Systems:   All systems reviewed and found to be negative except for the symptoms detailed above    Physical Examination:   VITAL SIGNS:   Vitals:    03/22/24 0954   BP: 121/69   Pulse: (!) 111   Resp: 18   Temp: 97.7 °F (36.5 °C)     GENERAL:  In no apparent distress.    HEAD:  No signs of head trauma.  EYES:  Pupils are equal.  Extraocular motions intact.    EARS:  Hearing grossly intact.  MOUTH:  Oropharynx is normal.   NECK:  No adenopathy, no JVD.     CHEST:  Chest with clear breath sounds bilaterally.  No wheezes, rales, rhonchi.    CARDIAC:  Regular rate and rhythm.  S1 and S2, without murmurs, gallops, rubs.  VASCULAR:  No Edema.  Peripheral pulses normal and equal in all extremities.  ABDOMEN:  Soft, without detectable tenderness.  No sign of distention.  No   rebound or guarding, and no masses palpated.   Bowel Sounds normal.  MUSCULOSKELETAL:  Good range of motion of all major joints. Extremities without clubbing, cyanosis or edema.    NEUROLOGIC EXAM:  Alert and oriented x 3.  No focal sensory or strength deficits.   Speech normal.  Follows commands.  PSYCHIATRIC:  Mood normal.    No results for input(s): "CBC" in the last 72 hours.   No results for input(s): "CMP" in the last 72 hours.     Assessment:  Problem List Items Addressed This Visit          Oncology    History of follicular lymphoma - Primary       Other    Mediastinal lymphadenopathy       Stage III NHL (follicular, grade 1), diagnosed November 2013:  -Status post R-CHOP x 6 cycles, 12/2013 - 4/2014  -Bone marrow biopsy 08/14/2014  -Status post maintenance Rituxan q8 weeks x 12, 9/3/2014 through 5/19/2016  -No recurrence or progression (CTs: 08/2017)  -No recurrence or progression (CTs: 08/06/2018)  -Soft tissue prominence along the greater curvature of distal gastric body and gastric pylorus (ingested material versus other pathology) (CTs: 08/06/2018)  -Screening colonoscopy (05/02/2016): Moderate diffuse diverticulosis  -DEXA " scan (08/20/2018): Osteopenia of both femurs; normal BMD lumbar spine; mild decrease in mean densities within the lumbar spine and femurs since 2016  -Screening mammogram (01/17/2019): No malignancy  -No recurrence or progression (CTs: 08/13/2019)  -Stable 1.1 cm hypodense right thyroid lobe nodule; questionable soft tissue prominence of stomach greater curvature and along the anterior aspect of antrum (CTs: 08/13/2019)  >>>She declined GI evaluation  -No recurrence or progression (CTs: 08/20/2020)  -09/04/2020: Flow cytometry of blood: No evidence of monoclonality, acute leukemia, or lymphoproliferative disorder  -No recurrence on surveillance CT C/A/P/soft tissue of the neck with contrast (09/16/2021)  -surveillance CTs C/A/P/neck 12/13/2022:  Interval development of some subcarinal lymph nodes, largest 1.9 cm  -no lymphadenopathy or disease progression on restaging CTs C/A/P/neck 03/15/2023   -surveillance CTs C/A/P/neck 03/15/2024:  CLARITZA      Low-grade squamous intraepithelial lesion of cervix:  -02/15/2019: ThinPrep cervical Pap smear: Low-grade squamous intraepithelial lesion  -03/19/2019:  1. LEEP: Focal mild squamous dysplasia (GUILLERMINA I) with associated HPV effect; the dysplasia focally extends to the equal margin  2.  Endocervical curettage: No atypical or dysplastic cells identified  -06/20/2019: ThinPrep cervical Pap smear: Negative for intraepithelial lesion or malignancy  -08/13/2019: ThinPrep cervical Pap smear: Atypical squamous cells of undetermined significance; high risk HPV positive; high risk HPV 16, 18/45 negative  -03/11/2020: ThinPrep cervical Pap smear: Negative for intraepithelial malignancy  -09/01/2020: ThinPrep cervical Pap smear: Negative for intraepithelial lesion or malignancy; high risk HPV negative  -09/07/2021: ThinPrep cervical Pap smear: NIL        Plan:  Continue surveillance   Contrast-enhanced CT scans of C/A/P/neck for surveillance in March 2025  Repeat screening mammogram in  November 2024; overdue  Follow-up in 1 year with surveillance CTs, CBC, CMP, LDH.  -------------------------------      Status post R-CHOP x 6 cycles, 12/2013 - 04/2014  Completed 5 years of follow-up 04/2019.  Continue annual follow-up with labs and surveillance scans; earlier, as clinically indicated if any symptoms of concern in the interim  >>>  -surveillance CTs 09/16/2021:  CLARITZA  -surveillance CTs 12/13/2022:  Interval development of some some carinal lymph nodes, largest 1.9 cm  -restaging CTs 03/15/2023:  CLARITZA    -surveillance CTs C/A/P/neck 03/15/2024:  CLARITZA  >>>  Continue annual surveillance  Surveillance CT scans of C/A/P/soft tissues of the neck with contrast in 1 year (03/2025)    12/20/2022:  Today, calcium level is slightly high (calcium level 11.0; albumin 4.4)  Recheck CMP and ionized calcium level in 2 weeks, then follow-up with nurse practitioner.  Drinks 2 glasses of milk daily; each lots of cheese and ice cream; has been chewing 1 calcium tablet daily for several months/years; advised her to cut back on dairy for next 2 weeks before we recheck her calcium level.  Also advised her to stop taking calcium tablet for now  >>>  Calcium level normal 01/03/2023    -11/29/2022:  Bilateral screening mammogram with tomosynthesis (comparison:  02/25/2021 mammogram):  Both breasts negative, BI-RADS 1  -12/02/2023:  Bilateral screening mammogram (comparison:  11/29/2002 mammogram, etc.):  Both breasts negative (BI-RADS 1)  >>>  Repeat screening mammogram in 1 year (11/2024)     -Screening colonoscopy (05/02/2016): Moderate diffuse diverticulosis; otherwise, normal exam to cecum  -Next colonoscopy will be due in 10 years (05/2026)    Follow-up in 1 year (03/2025), with surveillance CTs C/A/P/soft tissue neck with contrast, CBC, CMP, and LDH level; earlier, if any concerns in the interim.     Above discussed at length with her.  All questions answered.    Discussed results of investigations performed in the  interim.  She understands and agrees with this plan.     ------------------               NCCN guidelines: Surveillance for follicular lymphoma:  Completed 6 cycles of R-CHOP 04/2014  Clinical H&P and labs every 3-6 months for 5 years (until 04/2019), then annually or as clinically indicated  Surveillance imaging should be performed whenever there are clinical indications  Surveillance imaging up to 2 years (until 04/2016) post completion of treatment: C/A/P CT scans with contrast no more than every 6 months  After 2 years (after 04/2016), no more frequently than annually          Follow-up:  No follow-ups on file.        Answers submitted by the patient for this visit:  Review of Systems Questionnaire (Submitted on 3/20/2024)  appetite change : No  unexpected weight change: No  mouth sores: No  visual disturbance: No  cough: Yes  shortness of breath: No  chest pain: No  abdominal pain: No  diarrhea: No  frequency: No  back pain: No  rash: No  headaches: No  adenopathy: Yes  nervous/ anxious: No

## 2024-11-01 ENCOUNTER — LAB REQUISITION (OUTPATIENT)
Dept: LAB | Facility: HOSPITAL | Age: 74
End: 2024-11-01
Payer: MEDICARE

## 2024-11-01 DIAGNOSIS — C96.6 UNIFOCAL LANGERHANS-CELL HISTIOCYTOSIS: ICD-10-CM

## 2024-11-01 PROCEDURE — 88184 FLOWCYTOMETRY/ TC 1 MARKER: CPT | Performed by: PATHOLOGY

## 2024-11-01 PROCEDURE — 88185 FLOWCYTOMETRY/TC ADD-ON: CPT

## 2024-11-04 LAB — MAYO GENERIC ORDERABLE RESULT: NORMAL

## 2024-11-15 ENCOUNTER — TELEPHONE (OUTPATIENT)
Dept: HEMATOLOGY/ONCOLOGY | Facility: CLINIC | Age: 74
End: 2024-11-15
Payer: MEDICARE

## 2024-11-17 PROBLEM — R87.612 LGSIL ON PAP SMEAR OF CERVIX: Status: ACTIVE | Noted: 2024-11-17

## 2024-11-18 ENCOUNTER — OFFICE VISIT (OUTPATIENT)
Dept: HEMATOLOGY/ONCOLOGY | Facility: CLINIC | Age: 74
End: 2024-11-18
Attending: INTERNAL MEDICINE
Payer: MEDICARE

## 2024-11-18 VITALS
BODY MASS INDEX: 24.48 KG/M2 | WEIGHT: 138.19 LBS | HEART RATE: 110 BPM | RESPIRATION RATE: 18 BRPM | DIASTOLIC BLOOD PRESSURE: 74 MMHG | TEMPERATURE: 98 F | HEIGHT: 63 IN | SYSTOLIC BLOOD PRESSURE: 115 MMHG | OXYGEN SATURATION: 95 %

## 2024-11-18 DIAGNOSIS — R59.0 MEDIASTINAL LYMPHADENOPATHY: ICD-10-CM

## 2024-11-18 DIAGNOSIS — Z85.72 HISTORY OF FOLLICULAR LYMPHOMA: Primary | ICD-10-CM

## 2024-11-18 DIAGNOSIS — Z85.72 ENCOUNTER FOR FOLLOW-UP SURVEILLANCE OF LYMPHOMA: ICD-10-CM

## 2024-11-18 DIAGNOSIS — C85.90 RECURRENT LYMPHOMA: ICD-10-CM

## 2024-11-18 DIAGNOSIS — Z08 ENCOUNTER FOR FOLLOW-UP SURVEILLANCE OF LYMPHOMA: ICD-10-CM

## 2024-11-18 DIAGNOSIS — R87.612 LGSIL ON PAP SMEAR OF CERVIX: ICD-10-CM

## 2024-11-18 DIAGNOSIS — H04.031: ICD-10-CM

## 2024-11-18 PROCEDURE — 99214 OFFICE O/P EST MOD 30 MIN: CPT | Mod: S$PBB,,, | Performed by: INTERNAL MEDICINE

## 2024-11-18 PROCEDURE — 99214 OFFICE O/P EST MOD 30 MIN: CPT | Mod: PBBFAC | Performed by: INTERNAL MEDICINE

## 2024-11-18 RX ORDER — EZETIMIBE 10 MG/1
TABLET ORAL
COMMUNITY

## 2024-11-18 NOTE — PROGRESS NOTES
History:  Past Medical History:   Diagnosis Date    HLD (hyperlipidemia)     Hypertension     Lymphoma, follicular     Unspecified cataract        Past Surgical History:   Procedure Laterality Date    EXTRACTION OF CATARACT      EYE SURGERY        Social History     Socioeconomic History    Marital status:    Tobacco Use    Smoking status: Never    Smokeless tobacco: Never   Substance and Sexual Activity    Alcohol use: Yes     Comment: Drink socially at most 2 drinks a week.    Drug use: Never    Sexual activity: Yes     Partners: Male     Birth control/protection: None      No family history on file.     Reason for Follow-up:  -Follicular lymphoma, grade 1, stage III, diagnosed 11/2013; s/p R-CHOP x6, then maintenance Rituxan every 8 weeks x12 (12/2013-05/2016)  -Low-grade squamous intraepithelial lesion of cervix       History of Present Illness:   History of follicular lymphoma        Oncologic/Hematologic History:  Oncology History    No history exists.   67-year-old white female with diagnosis of non-Hodgkin lymphoma stage III diagnosed in 11/2013. Patient initially with subsegmental mass, s/p FNA (9/2013) undeterminant and subsequent excisional biopsy 11/2013 congruent with grade 1 small B-cell follicular lymphoma.  CT Neck/Chest/Abdomen/Pelvis in 10/2013 and subsequent PET CT 12/2013 revealed bulky LAD (largest up to 14 cm, SUV up to 8, both above and below diaphragm.  Patient had originally deferred BM biopsy, and subsequently received CHOP+Rituxan x 6 cycles, from 12/2013-4/2014.  Repeat PET after 3 cycles (2/2014) showed interval decrease in disease burden, and repeat PET following treatment (5/2014) was negative for any active/residual disease.   Completed 6 cycles of RCHOP from 12/2013 to 4/2014, followed by Rituxan maintenance q 8 weeks for 12 cycles, 9/3/2014-5/192016  Restaging scans on 1/11/16 and August 2016 show no disease, mesenteric stranding  Restaging scans on 8/31/17 did not reveal  progression of disease       Interval History:  [No matching plan found]   [No matching plan found]   03/22/2024:   -03/15/2024:  Surveillance CT soft tissues of the neck with contrast (comparison:  CT neck 03/15/2023):  No cervical lymphadenopathy  -03/15/2024: Surveillance CTs C/A/P with contrast (comparison:  03/15/2023):  No evidence of malignancy C/A/P  -12/02/2023:  Bilateral screening mammogram (comparison:  11/29/2002 mammogram, etc.):  Both breasts negative (BI-RADS 1)  -03/22/2024:  Labs reviewed:  WBC 3.25; hemoglobin 13 5; platelets 223; ANC 1.6, stable; CMP normal; LDH normal  Presents for a follow-up visit.  Doing well.  No complaints.  Mild cough.  No unusual headaches, focal neurological symptoms, vision impairment, chest pain, cough, dyspnea, hemoptysis, unusual bleeding or bruising in any form, recurrent fevers/drenching night sweats/anorexia/unintentional weight loss, any new lumps or lymphadenopathy, abdominal pain, nausea, vomiting, change in bowel habits, GI bleeding, bone pains, etc..  ECOG 0.    11/18/2024:  -10/23/2024: CT orbits without IV contrast (ordered by Kirit Barone MD, [facility], for evaluation of chemosis and enlargement of lacrimal gland of right eye, irritated red eye, discharge, headache, itching, swelling in eyelid and tearing): Abnormal asymmetry enlargement of right lacrimal gland (32 x 24 x 11 mm; for example, left lacrimal gland measures 6 x 5 x 10 mm), worrisome for lymphoma recurrence within the lacrimal gland  -11/13/2024: FDG PET-CT (comparison: 05/14/2024) (not ordered by me):  CLARITZA  Presents for a follow-up visit.  Says that she noticed lumps on the skin just lateral to right eye in October of the year.  The lesion is not enlarging.  No pain or itching.  At the same time, her Ob gyn noticed inflammation of the right eye.  Says that she had biopsy of the skin lesion done by Dr. Jeremi Wills MD, but we do not have pathology report as yet.  According  to her the biopsy showed low-grade lymphoma (if that is true, then, she will need radiation therapy to the lesion lateral to the right eye; of course, I need to see with the pathology report which we will request).  No weakness, fatigue, malaise, recurrent fevers or chills, drenching night sweats, anorexia, unintentional weight loss, unusual headaches, focal neurological symptoms, chest pain, cough, dyspnea, abdominal pain, nausea, vomiting, etc..  Has not noticed any other new lumps or lymphadenopathy.    Medications:  Current Outpatient Medications on File Prior to Visit   Medication Sig Dispense Refill    amLODIPine (NORVASC) 2.5 MG tablet Take 1 tablet (2.5 mg total) by mouth once daily. 90 tablet 1    ascorbic acid, vitamin C, (VITAMIN C) 500 MG tablet Take 500 mg by mouth once daily.      cholecalciferol, vitamin D3, (D3-5000) 125 mcg (5,000 unit) capsule       cyanocobalamin (B-12 DOTS) 500 MCG tablet       estradioL (VAGIFEM) 10 mcg Tab Place vaginally.      ezetimibe (ZETIA) 10 mg tablet       krill oil 500 mg Cap       Lactobacillus acidophilus (PROBIOTIC) 10 billion cell Cap       multivitamin (THERAGRAN) per tablet Take 1 tablet by mouth once daily.      calcium-vitamin D3-vitamin K 650 mg-12.5 mcg-40 mcg Chew Take by mouth.      glucosamine HCl (GLUCOSAMINE, BULK, MISC) by Misc.(Non-Drug; Combo Route) route.      pravastatin (PRAVACHOL) 20 MG tablet Take 1 tablet (20 mg total) by mouth once daily. 90 tablet 1    zinc gluconate 50 mg tablet Take 50 mg by mouth once daily.       No current facility-administered medications on file prior to visit.       Review of Systems:   All systems reviewed and found to be negative except for the symptoms detailed above    Physical Examination:   VITAL SIGNS:   Vitals:    11/18/24 1156   BP: 115/74   Pulse: 110   Resp: 18   Temp: 98.3 °F (36.8 °C)       GENERAL:  In no apparent distress.    HEAD:  No signs of head trauma.  EYES:  Pupils are equal.  Extraocular motions  "intact.    EARS:  Hearing grossly intact.  MOUTH:  Oropharynx is normal.   NECK:  No adenopathy, no JVD.     CHEST:  Chest with clear breath sounds bilaterally.  No wheezes, rales, rhonchi.    CARDIAC:  Regular rate and rhythm.  S1 and S2, without murmurs, gallops, rubs.  VASCULAR:  No Edema.  Peripheral pulses normal and equal in all extremities.  ABDOMEN:  Soft, without detectable tenderness.  No sign of distention.  No   rebound or guarding, and no masses palpated.   Bowel Sounds normal.  MUSCULOSKELETAL:  Good range of motion of all major joints. Extremities without clubbing, cyanosis or edema.    NEUROLOGIC EXAM:  Alert and oriented x 3.  No focal sensory or strength deficits.   Speech normal.  Follows commands.  PSYCHIATRIC:  Mood normal.    No results for input(s): "CBC" in the last 72 hours.   No results for input(s): "CMP" in the last 72 hours.     Assessment:  Problem List Items Addressed This Visit          Ophtho    Enlarged lacrimal gland, right       Renal/    LGSIL on Pap smear of cervix       Oncology    History of follicular lymphoma - Primary    Encounter for follow-up surveillance of lymphoma       Other    Mediastinal lymphadenopathy       Orders for 11/18/2024:  Follow-up with ophthalmology for lacrimal gland inflammation  Please get the report of biopsy of skin lesion from the office of Dr. Jeremi mehta MD  In November 2025, surveillance CT scans of C/A/P/soft tissues of the neck with contrast, then follow-up with CBC, CMP, and LDH  Screening mammogram is due now    Above discussed with the patient.  All questions answered.    Discussed labs and scans and gave her copies of relevant results.    She understands and agrees with this plan.  ====================================      Stage III NHL (follicular, grade 1), diagnosed November 2013:  -diagnosed 11/2013  -CTs C/A/P/neck 10/2013, PET-CT 12/2013:  Bulky lymphadenopathy, largest up to 14 cm, both above and below diaphragm  -S/P R-CHOP x6 " cycles 12/2013-04/2024  -restaging PET-CT post 3 cycles, 02/2014:  Positive response  -restaging PET-CT 05/14/2024:  Complete response  -S/P bone marrow biopsy 08/14/2024  -S/P maintenance Rituxan every 2 weeks X 12 cycles, 09/03/2014-05/19/2016  -restaging CTs 01/11/2016, 08/2016:  CLARITZA  -restaging CTs 08/31/2017:  CLARITZA  -restaging CTs 08/06/2018: CLARITZA  -restaging CTs 08/06/2018: Soft tissue prominence along the greater curvature of distal gastric body and gastric pylorus (ingested material versus other pathology)  -screening colonoscopy 05/02/2016: Moderate diffuse diverticulosis  -DEXA scan (08/20/2018): Osteopenia of both femurs; normal BMD lumbar spine; mild decrease in mean densities within the lumbar spine and femurs since 2016  -Screening mammogram (01/17/2019): No malignancy  -restaging CTs 08/13/2019:  CLARITZA  -restaging CTs 08/13/2019:  Questionable soft tissue prominence of stomach greater curvature and along the anterior aspect of antrum  (She declined GI evaluation)  -restaging CTs 08/20/2020:  CLARITZA  -flow cytometry of blood 09/04/2020:  Negative for leukemia/lymphoma   -restaging CTs C/A/P/neck 09/16/2021:  CLARITZA  -restaging CTs C/A/P/neck 12/13/2022:  Interval development of some subcarinal lymph nodes, largest 1.9 cm   -restaging CTs C/A/P/neck 03/15/2023:  CLARITZA  -restaging CTs C/A/P/neck 03/15/2024: CLARITZA  -10/23/2024: CT orbits without IV contrast (ordered by Kirit Barone MD, [facility], for evaluation of chemosis and enlargement of lacrimal gland of right eye, irritated red eye, discharge, headache, itching, swelling in eyelid and tearing): Abnormal asymmetry enlargement of right lacrimal gland (32 x 24 x 11 mm; for example, left lacrimal gland measures 6 x 5 x 10 mm), worrisome for lymphoma recurrence within the lacrimal gland  -11/13/2024: FDG PET-CT (comparison: 05/14/2024) (not ordered by me):  CLARITZA  >>>  Continue follow-up with Ophthalmology  We will request a report of biopsy of skin  lesion lateral to the right eye, from the office of Dr. Jeremi Wills MD  From medical oncology standpoint, continue annual surveillance, body any new symptoms or signs of concern in the interim  Surveillance CTs C/A/P/soft tissues of the neck with contrast in 1 year (11/2025)  Continue annual surveillance  Follow-up in 2 weeks with a report of skin biopsy (if she indeed has recurrent low-grade follicular lymphoma, then, she will need radiation therapy to the skin lesion)      12/20/2022:  Today, calcium level is slightly high (calcium level 11.0; albumin 4.4)  Recheck CMP and ionized calcium level in 2 weeks, then follow-up with nurse practitioner.  Drinks 2 glasses of milk daily; each lots of cheese and ice cream; has been chewing 1 calcium tablet daily for several months/years; advised her to cut back on dairy for next 2 weeks before we recheck her calcium level.  Also advised her to stop taking calcium tablet for now  >>>  Calcium level normal 01/03/2023      -11/29/2022:  Bilateral screening mammogram with tomosynthesis (comparison:  02/25/2021 mammogram):  Both breasts negative, BI-RADS 1  -12/02/2023:  Bilateral screening mammogram (comparison:  11/29/2002 mammogram, etc.):  Both breasts negative (BI-RADS 1)  >>>  Repeat screening mammogram in 1 year (11/2024)       -Screening colonoscopy (05/02/2016): Moderate diffuse diverticulosis; otherwise, normal exam to cecum  -Next colonoscopy will be due in 10 years (05/2026)      Low-grade squamous intraepithelial lesion of cervix:  -02/15/2019: ThinPrep cervical Pap smear: Low-grade squamous intraepithelial lesion  -03/19/2019:  1. LEEP: Focal mild squamous dysplasia (GUILLERMINA I) with associated HPV effect; the dysplasia focally extends to the equal margin  2.  Endocervical curettage: No atypical or dysplastic cells identified  -06/20/2019: ThinPrep cervical Pap smear: Negative for intraepithelial lesion or malignancy  -08/13/2019: ThinPrep cervical Pap smear: Atypical  squamous cells of undetermined significance; high risk HPV positive; high risk HPV 16, 18/45 negative  -03/11/2020: ThinPrep cervical Pap smear: Negative for intraepithelial malignancy  -09/01/2020: ThinPrep cervical Pap smear: Negative for intraepithelial lesion or malignancy; high risk HPV negative  -09/07/2021: ThinPrep cervical Pap smear: NIL  --------------------------------------------------------------------------            NCCN guidelines: Surveillance for follicular lymphoma:  Completed 6 cycles of R-CHOP 04/2014  Clinical H&P and labs every 3-6 months for 5 years (until 04/2019), then annually or as clinically indicated  Surveillance imaging should be performed whenever there are clinical indications  Surveillance imaging up to 2 years (until 04/2016) post completion of treatment: C/A/P CT scans with contrast no more than every 6 months  After 2 years (after 04/2016), no more frequently than annually          Follow-up:  No follow-ups on file.    Answers submitted by the patient for this visit:  Review of Systems Questionnaire (Submitted on 11/12/2024)  appetite change : No  unexpected weight change: No  mouth sores: No  visual disturbance: Yes  cough: Yes  shortness of breath: No  chest pain: No  abdominal pain: No  diarrhea: No  frequency: Yes  back pain: Yes  rash: No  headaches: Yes  adenopathy: No  nervous/ anxious: No

## 2024-11-18 NOTE — Clinical Note
Orders for 11/18/2024: Follow-up with ophthalmology for lacrimal gland inflammation In November 2025, surveillance CT scans of C/A/P/soft tissues of the neck with contrast, then follow-up with CBC, CMP, and LDH Screening mammogram is due now

## 2024-11-25 ENCOUNTER — TELEPHONE (OUTPATIENT)
Dept: HEMATOLOGY/ONCOLOGY | Facility: CLINIC | Age: 74
End: 2024-11-25
Payer: MEDICARE

## 2024-11-25 PROBLEM — H04.001: Status: ACTIVE | Noted: 2024-11-25

## 2024-11-25 PROBLEM — C85.99: Status: ACTIVE | Noted: 2024-11-25

## 2024-11-26 ENCOUNTER — OFFICE VISIT (OUTPATIENT)
Dept: HEMATOLOGY/ONCOLOGY | Facility: CLINIC | Age: 74
End: 2024-11-26
Attending: INTERNAL MEDICINE
Payer: MEDICARE

## 2024-11-26 VITALS
SYSTOLIC BLOOD PRESSURE: 121 MMHG | HEART RATE: 102 BPM | BODY MASS INDEX: 24.34 KG/M2 | TEMPERATURE: 98 F | DIASTOLIC BLOOD PRESSURE: 73 MMHG | RESPIRATION RATE: 18 BRPM | HEIGHT: 63 IN | OXYGEN SATURATION: 97 % | WEIGHT: 137.38 LBS

## 2024-11-26 DIAGNOSIS — C85.90 RECURRENT LYMPHOMA: Primary | ICD-10-CM

## 2024-11-26 DIAGNOSIS — C85.99 NON-HODGKIN'S LYMPHOMA OF SKIN: ICD-10-CM

## 2024-11-26 DIAGNOSIS — Z85.72 ENCOUNTER FOR FOLLOW-UP SURVEILLANCE OF LYMPHOMA: ICD-10-CM

## 2024-11-26 DIAGNOSIS — H04.001 LACRIMAL GLAND INFLAMMATION, RIGHT: ICD-10-CM

## 2024-11-26 DIAGNOSIS — Z85.72 HISTORY OF FOLLICULAR LYMPHOMA: ICD-10-CM

## 2024-11-26 DIAGNOSIS — R87.612 LGSIL ON PAP SMEAR OF CERVIX: Primary | ICD-10-CM

## 2024-11-26 DIAGNOSIS — Z08 ENCOUNTER FOR FOLLOW-UP SURVEILLANCE OF LYMPHOMA: ICD-10-CM

## 2024-11-26 DIAGNOSIS — C85.90 RECURRENT LYMPHOMA: ICD-10-CM

## 2024-11-26 PROCEDURE — 99214 OFFICE O/P EST MOD 30 MIN: CPT | Mod: PBBFAC | Performed by: INTERNAL MEDICINE

## 2024-11-26 NOTE — PROGRESS NOTES
History:  Past Medical History:   Diagnosis Date    HLD (hyperlipidemia)     Hypertension     Lymphoma, follicular     Unspecified cataract        Past Surgical History:   Procedure Laterality Date    EXTRACTION OF CATARACT      EYE SURGERY        Social History     Socioeconomic History    Marital status:    Tobacco Use    Smoking status: Never    Smokeless tobacco: Never   Substance and Sexual Activity    Alcohol use: Yes     Comment: Drink socially at most 2 drinks a week.    Drug use: Never    Sexual activity: Yes     Partners: Male     Birth control/protection: None      No family history on file.     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   67-year-old white female with diagnosis of non-Hodgkin lymphoma stage III diagnosed in 11/2013. Patient initially with subsegmental mass, s/p FNA (9/2013) undeterminant and subsequent excisional biopsy 11/2013 congruent with grade 1 small B-cell follicular lymphoma.  CT Neck/Chest/Abdomen/Pelvis in 10/2013 and subsequent PET CT 12/2013 revealed bulky LAD (largest up to 14 cm, SUV up to 8, both above and below diaphragm.  Patient had originally deferred BM biopsy, and subsequently received CHOP+Rituxan x 6 cycles, from 12/2013-4/2014.  Repeat PET after 3 cycles (2/2014) showed interval decrease in disease burden, and repeat PET following treatment (5/2014) was negative for any active/residual disease.   Completed 6 cycles of RCHOP from 12/2013 to 4/2014, followed by Rituxan maintenance q 8 weeks for 12 cycles, 9/3/2014-5/192016  Restaging scans on 1/11/16 and August 2016 show no  disease, mesenteric stranding  Restaging scans on 8/31/17 did not reveal progression of disease       Interval History:  [No matching plan found]   [No matching plan found]     11/18/2024:  -10/23/2024: CT orbits without IV contrast (ordered by Kirit Barone MD, [facility], for evaluation of chemosis and enlargement of lacrimal gland of right eye, irritated red eye, discharge, headache, itching, swelling in eyelid and tearing): Abnormal asymmetry enlargement of right lacrimal gland (32 x 24 x 11 mm; for example, left lacrimal gland measures 6 x 5 x 10 mm), worrisome for lymphoma recurrence within the lacrimal gland  -11/13/2024: FDG PET-CT (comparison: 05/14/2024) (not ordered by me):  CLARITZA  Presents for a follow-up visit.  Says that she noticed lumps on the skin just lateral to right eye in October of the year.  The lesion is not enlarging.  No pain or itching.  At the same time, her Ob gyn noticed inflammation of the right eye.  Says that she had biopsy of the skin lesion done by Dr. Jeremi Wills MD, but we do not have pathology report as yet.  According to her the biopsy showed low-grade lymphoma (if that is true, then, she will need radiation therapy to the lesion lateral to the right eye; of course, I need to see with the pathology report which we will request).  No weakness, fatigue, malaise, recurrent fevers or chills, drenching night sweats, anorexia, unintentional weight loss, unusual headaches, focal neurological symptoms, chest pain, cough, dyspnea, abdominal pain, nausea, vomiting, etc..  Has not noticed any other new lumps or lymphadenopathy.    11/26/2024:  -11/01/2024: Right orbital mass, biopsy:  Low-grade B-cell lymphoma, consistent with recurrent follicular lymphoma, grade 1-2  Presents for a follow-up visit.  No systemic symptoms.  Has skin lesion just lateral to the letter canthus of right eye.  This is not enlarging.  No pruritus.  She says that she has allergies and reports  watering from both eyes.  Denies any particularly irritating or gritty feeling in the right eye.  We discussed pathology report and gave her a copy.    Medications:  Current Outpatient Medications on File Prior to Visit   Medication Sig Dispense Refill    amLODIPine (NORVASC) 2.5 MG tablet Take 1 tablet (2.5 mg total) by mouth once daily. 90 tablet 1    ascorbic acid, vitamin C, (VITAMIN C) 500 MG tablet Take 500 mg by mouth once daily.      cholecalciferol, vitamin D3, (D3-5000) 125 mcg (5,000 unit) capsule       cyanocobalamin (B-12 DOTS) 500 MCG tablet       estradioL (VAGIFEM) 10 mcg Tab Place vaginally.      ezetimibe (ZETIA) 10 mg tablet       krill oil 500 mg Cap       Lactobacillus acidophilus (PROBIOTIC) 10 billion cell Cap       multivitamin (THERAGRAN) per tablet Take 1 tablet by mouth once daily.      calcium-vitamin D3-vitamin K 650 mg-12.5 mcg-40 mcg Chew Take by mouth.      glucosamine HCl (GLUCOSAMINE, BULK, MISC) by Misc.(Non-Drug; Combo Route) route.      pravastatin (PRAVACHOL) 20 MG tablet Take 1 tablet (20 mg total) by mouth once daily. 90 tablet 1    zinc gluconate 50 mg tablet Take 50 mg by mouth once daily.       No current facility-administered medications on file prior to visit.       Review of Systems:   All systems reviewed and found to be negative except for the symptoms detailed above    Physical Examination:   VITAL SIGNS:   Vitals:    11/26/24 0919   BP: 121/73   Pulse: 102   Resp: 18   Temp: 98.2 °F (36.8 °C)         GENERAL:  In no apparent distress.    HEAD:  No signs of head trauma.  EYES:  Pupils are equal.  Extraocular motions intact.    EARS:  Hearing grossly intact.  MOUTH:  Oropharynx is normal.   NECK:  No adenopathy, no JVD.     CHEST:  Chest with clear breath sounds bilaterally.  No wheezes, rales, rhonchi.    CARDIAC:  Regular rate and rhythm.  S1 and S2, without murmurs, gallops, rubs.  VASCULAR:  No Edema.  Peripheral pulses normal and equal in all  "extremities.  ABDOMEN:  Soft, without detectable tenderness.  No sign of distention.  No   rebound or guarding, and no masses palpated.   Bowel Sounds normal.  MUSCULOSKELETAL:  Good range of motion of all major joints. Extremities without clubbing, cyanosis or edema.    NEUROLOGIC EXAM:  Alert and oriented x 3.  No focal sensory or strength deficits.   Speech normal.  Follows commands.  PSYCHIATRIC:  Mood normal.    No results for input(s): "CBC" in the last 72 hours.   No results for input(s): "CMP" in the last 72 hours.     Assessment:  Problem List Items Addressed This Visit          Ophtho    Lacrimal gland inflammation, right       Renal/    LGSIL on Pap smear of cervix - Primary       Oncology    History of follicular lymphoma    Encounter for follow-up surveillance of lymphoma    Recurrent lymphoma    Non-Hodgkin's lymphoma of skin         Orders for 11/26/2024:   Refer to Radiation Oncology for radiotherapy to recurrent follicular carcinoma of skin   Follow-up in 1 month    Above discussed with the patient.  All questions answered.  Discussed pathology report and gave her a copy.  Plan of management discussed.    She understands and agrees with this plan.  =================================      Stage III NHL (follicular, grade 1), diagnosed November 2013:  -diagnosed 11/2013  -CTs C/A/P/neck 10/2013, PET-CT 12/2013:  Bulky lymphadenopathy, largest up to 14 cm, both above and below diaphragm  -S/P R-CHOP x6 cycles 12/2013-04/2024  -restaging PET-CT post 3 cycles, 02/2014:  Positive response  -restaging PET-CT 05/14/2024:  Complete response  -S/P bone marrow biopsy 08/14/2024  -S/P maintenance Rituxan every 2 weeks X 12 cycles, 09/03/2014-05/19/2016  -restaging CTs 01/11/2016, 08/2016:  CLARITZA  -restaging CTs 08/31/2017:  CLARITZA  -restaging CTs 08/06/2018: CLARITZA  -restaging CTs 08/06/2018: Soft tissue prominence along the greater curvature of distal gastric body and gastric pylorus (ingested material versus other " pathology)  -screening colonoscopy 05/02/2016: Moderate diffuse diverticulosis  -DEXA scan (08/20/2018): Osteopenia of both femurs; normal BMD lumbar spine; mild decrease in mean densities within the lumbar spine and femurs since 2016  -Screening mammogram (01/17/2019): No malignancy  -restaging CTs 08/13/2019:  CLARITZA  -restaging CTs 08/13/2019:  Questionable soft tissue prominence of stomach greater curvature and along the anterior aspect of antrum  (She declined GI evaluation)  -restaging CTs 08/20/2020:  CLARITZA  -flow cytometry of blood 09/04/2020:  Negative for leukemia/lymphoma   -restaging CTs C/A/P/neck 09/16/2021:  CLARITZA  -restaging CTs C/A/P/neck 12/13/2022:  Interval development of some subcarinal lymph nodes, largest 1.9 cm   -restaging CTs C/A/P/neck 03/15/2023:  CLARITZA  -restaging CTs C/A/P/neck 03/15/2024: CLARITZA  >>>  Recurrent follicular lymphoma, grade 1-2 (chemosis; enlargement of right started lacrimal gland; inflammation right eye; skin lesion/bumps just lateral to the right eye 10/2024:  -11/18/2024:  On follow-up visit, she said that she noticed lumps on the skin just lateral to right eye in October of the year.  The lesion is not enlarging.  No pain or itching.  At the same time, her Ob gyn noticed inflammation of the right eye.  She had biopsy of the skin lesion done by Dr. Jeremi Wills MD; she denied any systemic symptoms/B symptoms  -10/23/2024: CT orbits without IV contrast (ordered by Kirit Barone MD, [facility], for evaluation of chemosis and enlargement of lacrimal gland of right eye, irritated red eye, discharge, headache, itching, swelling in eyelid and tearing): Abnormal asymmetric enlargement of right lacrimal gland (32 x 24 x 11 mm; for example, left lacrimal gland measures 6 x 5 x 10 mm), worrisome for lymphoma recurrence within the lacrimal gland  -right orbital mass biopsy 11/01/2024: Low-grade B-cell lymphoma, consistent with recurrent follicular lymphoma, grade  1-2  -11/13/2024: FDG PET-CT (comparison: 05/14/2024) (not ordered by me):  CLARITZA  >>>  Plan:  -we will refer to Radiation Oncology for ISRT to right periorbital/orbital recurrent follicular lymphoma, grade 1-2  -PET-CT is negative for disease elsewhere as well as at the site of recurrence  -may avoid bone marrow biopsy at this time  -if ISRT is not feasible, then, we will treat with rituximab      12/20/2022:  Today, calcium level is slightly high (calcium level 11.0; albumin 4.4)  Recheck CMP and ionized calcium level in 2 weeks, then follow-up with nurse practitioner.  Drinks 2 glasses of milk daily; each lots of cheese and ice cream; has been chewing 1 calcium tablet daily for several months/years; advised her to cut back on dairy for next 2 weeks before we recheck her calcium level.  Also advised her to stop taking calcium tablet for now  >>>  Calcium level normal 01/03/2023      -11/29/2022:  Bilateral screening mammogram with tomosynthesis (comparison:  02/25/2021 mammogram):  Both breasts negative, BI-RADS 1  -12/02/2023:  Bilateral screening mammogram (comparison:  11/29/2002 mammogram, etc.):  Both breasts negative (BI-RADS 1)  >>>  Repeat screening mammogram in 1 year (11/2024)       -Screening colonoscopy (05/02/2016): Moderate diffuse diverticulosis; otherwise, normal exam to cecum  -Next colonoscopy will be due in 10 years (05/2026)      Low-grade squamous intraepithelial lesion of cervix:  -02/15/2019: ThinPrep cervical Pap smear: Low-grade squamous intraepithelial lesion  -03/19/2019:  1. LEEP: Focal mild squamous dysplasia (GUILLERMINA I) with associated HPV effect; the dysplasia focally extends to the equal margin  2.  Endocervical curettage: No atypical or dysplastic cells identified  -06/20/2019: ThinPrep cervical Pap smear: Negative for intraepithelial lesion or malignancy  -08/13/2019: ThinPrep cervical Pap smear: Atypical squamous cells of undetermined significance; high risk HPV positive; high risk HPV  16, 18/45 negative  -03/11/2020: ThinPrep cervical Pap smear: Negative for intraepithelial malignancy  -09/01/2020: ThinPrep cervical Pap smear: Negative for intraepithelial lesion or malignancy; high risk HPV negative  -09/07/2021: ThinPrep cervical Pap smear: NIL  ======================================            NCCN guidelines: Surveillance for follicular lymphoma:  Completed 6 cycles of R-CHOP 04/2014  Clinical H&P and labs every 3-6 months for 5 years (until 04/2019), then annually or as clinically indicated  Surveillance imaging should be performed whenever there are clinical indications  Surveillance imaging up to 2 years (until 04/2016) post completion of treatment: C/A/P CT scans with contrast no more than every 6 months  After 2 years (after 04/2016), no more frequently than annually          Follow-up:  No follow-ups on file.    Answers submitted by the patient for this visit:  Review of Systems Questionnaire (Submitted on 11/12/2024)  appetite change : No  unexpected weight change: No  mouth sores: No  visual disturbance: Yes  cough: Yes  shortness of breath: No  chest pain: No  abdominal pain: No  diarrhea: No  frequency: Yes  back pain: Yes  rash: No  headaches: Yes  adenopathy: No  nervous/ anxious: No

## 2024-11-26 NOTE — Clinical Note
Orders for 11/26/2024:  Refer to Radiation Oncology for radiotherapy to recurrent follicular carcinoma of skin  Follow-up in 1 month

## 2024-12-12 ENCOUNTER — CLINICAL SUPPORT (OUTPATIENT)
Dept: RADIATION THERAPY | Facility: HOSPITAL | Age: 74
End: 2024-12-12
Attending: RADIOLOGY
Payer: MEDICARE

## 2024-12-12 DIAGNOSIS — C85.99 NON-HODGKIN'S LYMPHOMA OF SKIN: ICD-10-CM

## 2024-12-12 DIAGNOSIS — C85.90 RECURRENT LYMPHOMA: ICD-10-CM

## 2024-12-12 PROCEDURE — 77334 RADIATION TREATMENT AID(S): CPT | Performed by: RADIOLOGY

## 2024-12-17 PROCEDURE — 77301 RADIOTHERAPY DOSE PLAN IMRT: CPT | Performed by: RADIOLOGY

## 2024-12-17 PROCEDURE — 77300 RADIATION THERAPY DOSE PLAN: CPT | Performed by: RADIOLOGY

## 2024-12-17 PROCEDURE — 77338 DESIGN MLC DEVICE FOR IMRT: CPT | Performed by: RADIOLOGY

## 2024-12-19 PROCEDURE — 77386 HC IMRT, COMPLEX: CPT | Performed by: RADIOLOGY

## 2024-12-20 PROCEDURE — 77386 HC IMRT, COMPLEX: CPT

## 2024-12-23 PROCEDURE — 77336 RADIATION PHYSICS CONSULT: CPT | Performed by: RADIOLOGY

## 2024-12-23 PROCEDURE — 77386 HC IMRT, COMPLEX: CPT | Performed by: RADIOLOGY

## 2024-12-24 PROCEDURE — 77386 HC IMRT, COMPLEX: CPT

## 2024-12-26 PROCEDURE — 77386 HC IMRT, COMPLEX: CPT | Performed by: RADIOLOGY

## 2024-12-27 PROCEDURE — 77386 HC IMRT, COMPLEX: CPT

## 2024-12-30 PROCEDURE — 77336 RADIATION PHYSICS CONSULT: CPT

## 2024-12-30 PROCEDURE — 77386 HC IMRT, COMPLEX: CPT

## 2024-12-31 PROCEDURE — 77386 HC IMRT, COMPLEX: CPT | Performed by: RADIOLOGY

## 2025-01-02 ENCOUNTER — CLINICAL SUPPORT (OUTPATIENT)
Dept: RADIATION THERAPY | Facility: HOSPITAL | Age: 75
End: 2025-01-02
Attending: RADIOLOGY
Payer: MEDICARE

## 2025-01-02 PROCEDURE — 77386 HC IMRT, COMPLEX: CPT | Performed by: RADIOLOGY

## 2025-01-03 PROCEDURE — 77386 HC IMRT, COMPLEX: CPT

## 2025-01-06 PROCEDURE — 77336 RADIATION PHYSICS CONSULT: CPT

## 2025-01-06 PROCEDURE — 77386 HC IMRT, COMPLEX: CPT

## 2025-01-07 PROCEDURE — 77386 HC IMRT, COMPLEX: CPT | Performed by: RADIOLOGY

## 2025-02-07 ENCOUNTER — HOSPITAL ENCOUNTER (OUTPATIENT)
Dept: RADIOLOGY | Facility: HOSPITAL | Age: 75
Discharge: HOME | End: 2025-02-07
Attending: OBSTETRICS & GYNECOLOGY
Payer: MEDICARE

## 2025-02-07 DIAGNOSIS — M81.0 SENILE OSTEOPOROSIS: ICD-10-CM

## 2025-02-07 DIAGNOSIS — Z12.31 OTHER SCREENING MAMMOGRAM: ICD-10-CM

## 2025-02-07 PROCEDURE — 77080 DXA BONE DENSITY AXIAL: CPT | Mod: TC

## 2025-02-07 PROCEDURE — 77063 BREAST TOMOSYNTHESIS BI: CPT | Mod: TC

## 2025-02-07 PROCEDURE — 77063 BREAST TOMOSYNTHESIS BI: CPT | Mod: 26,,, | Performed by: RADIOLOGY

## 2025-02-07 PROCEDURE — 77067 SCR MAMMO BI INCL CAD: CPT | Mod: 26,,, | Performed by: RADIOLOGY

## 2025-02-11 ENCOUNTER — TELEPHONE (OUTPATIENT)
Dept: HEMATOLOGY/ONCOLOGY | Facility: CLINIC | Age: 75
End: 2025-02-11
Payer: MEDICARE

## 2025-02-12 ENCOUNTER — OFFICE VISIT (OUTPATIENT)
Dept: HEMATOLOGY/ONCOLOGY | Facility: CLINIC | Age: 75
End: 2025-02-12
Attending: INTERNAL MEDICINE
Payer: MEDICARE

## 2025-02-12 VITALS
TEMPERATURE: 98 F | OXYGEN SATURATION: 96 % | DIASTOLIC BLOOD PRESSURE: 79 MMHG | SYSTOLIC BLOOD PRESSURE: 122 MMHG | BODY MASS INDEX: 24.45 KG/M2 | WEIGHT: 138 LBS | HEIGHT: 63 IN | RESPIRATION RATE: 18 BRPM | HEART RATE: 89 BPM

## 2025-02-12 DIAGNOSIS — C85.90 RECURRENT LYMPHOMA: Primary | ICD-10-CM

## 2025-02-12 DIAGNOSIS — C85.90 RECURRENT LYMPHOMA: ICD-10-CM

## 2025-02-12 DIAGNOSIS — H04.001 LACRIMAL GLAND INFLAMMATION, RIGHT: ICD-10-CM

## 2025-02-12 DIAGNOSIS — R87.612 LGSIL ON PAP SMEAR OF CERVIX: ICD-10-CM

## 2025-02-12 DIAGNOSIS — Z85.72 HISTORY OF FOLLICULAR LYMPHOMA: ICD-10-CM

## 2025-02-12 DIAGNOSIS — C85.99 NON-HODGKIN'S LYMPHOMA OF SKIN: ICD-10-CM

## 2025-02-12 DIAGNOSIS — H04.031: ICD-10-CM

## 2025-02-12 DIAGNOSIS — H04.001 LACRIMAL GLAND INFLAMMATION, RIGHT: Primary | ICD-10-CM

## 2025-02-12 PROCEDURE — 99214 OFFICE O/P EST MOD 30 MIN: CPT | Mod: PBBFAC | Performed by: INTERNAL MEDICINE

## 2025-02-12 NOTE — PROGRESS NOTES
History:  Past Medical History:   Diagnosis Date    HLD (hyperlipidemia)     Hypertension     Lymphoma, follicular     Unspecified cataract        Past Surgical History:   Procedure Laterality Date    EXTRACTION OF CATARACT      EYE SURGERY        Social History     Socioeconomic History    Marital status:    Tobacco Use    Smoking status: Never    Smokeless tobacco: Never   Substance and Sexual Activity    Alcohol use: Yes     Comment: Drink socially at most 2 drinks a week.    Drug use: Never    Sexual activity: Yes     Partners: Male     Birth control/protection: None      No family history on file.     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   67-year-old white female with diagnosis of non-Hodgkin lymphoma stage III diagnosed in 11/2013. Patient initially with subsegmental mass, s/p FNA (9/2013) undeterminant and subsequent excisional biopsy 11/2013 congruent with grade 1 small B-cell follicular lymphoma.  CT Neck/Chest/Abdomen/Pelvis in 10/2013 and subsequent PET CT 12/2013 revealed bulky LAD (largest up to 14 cm, SUV up to 8, both above and below diaphragm.  Patient had originally deferred BM biopsy, and subsequently received CHOP+Rituxan x 6 cycles, from 12/2013-4/2014.  Repeat PET after 3 cycles (2/2014) showed interval decrease in disease burden, and repeat PET following treatment (5/2014) was negative for any active/residual disease.   Completed 6 cycles of RCHOP from 12/2013 to 4/2014, followed by Rituxan maintenance q 8 weeks for 12 cycles, 9/3/2014-5/192016  Restaging scans on 1/11/16 and August 2016 show no  disease, mesenteric stranding  Restaging scans on 8/31/17 did not reveal progression of disease       Interval History:  [No matching plan found]   [No matching plan found]     11/26/2024:  -11/01/2024: Right orbital mass, biopsy:  Low-grade B-cell lymphoma, consistent with recurrent follicular lymphoma, grade 1-2  Presents for a follow-up visit.  No systemic symptoms.  Has skin lesion just lateral to the letter canthus of right eye.  This is not enlarging.  No pruritus.  She says that she has allergies and reports watering from both eyes.  Denies any particularly irritating or gritty feeling in the right eye.  We discussed pathology report and gave her a copy.    02/12/2025:   -S/P radiotherapy to right orbit 12/19/2024-01/07/2025  -02/07/2025:  Screening mammogram, bilateral  -02/07/2025: DEXA scan:  Comparison 11/29/2022: Osteopenia based on hips, moderately increased fracture risk  Presents for a follow-up visit.  Doing well.  Great appetite.  Right eye swelling has resolved.  No irritation or tearing in right eye.  No new suspicious spots on skin.  No suspicious lymphadenopathy.  ECOG 0.  She will follow-up with the ophthalmologist tomorrow.    Medications:  Current Outpatient Medications on File Prior to Visit   Medication Sig Dispense Refill    amLODIPine (NORVASC) 2.5 MG tablet Take 1 tablet (2.5 mg total) by mouth once daily. 90 tablet 1    ascorbic acid, vitamin C, (VITAMIN C) 500 MG tablet Take 500 mg by mouth once daily.      cholecalciferol, vitamin D3, (D3-5000) 125 mcg (5,000 unit) capsule       cyanocobalamin (B-12 DOTS) 500 MCG tablet       estradioL (VAGIFEM) 10 mcg Tab Place vaginally.      ezetimibe (ZETIA) 10 mg tablet       krill oil 500 mg Cap       Lactobacillus acidophilus (PROBIOTIC) 10 billion cell Cap       multivitamin (THERAGRAN) per tablet Take 1 tablet by mouth once daily.      calcium-vitamin D3-vitamin K 650 mg-12.5 mcg-40 mcg Chew Take by mouth.      glucosamine HCl (GLUCOSAMINE,  "BULK, MISC) by Misc.(Non-Drug; Combo Route) route.      pravastatin (PRAVACHOL) 20 MG tablet Take 1 tablet (20 mg total) by mouth once daily. 90 tablet 1    zinc gluconate 50 mg tablet Take 50 mg by mouth once daily.       No current facility-administered medications on file prior to visit.       Review of Systems:   All systems reviewed and found to be negative except for the symptoms detailed above    Physical Examination:   VITAL SIGNS:   Vitals:    02/12/25 0936   BP: 122/79   Pulse: 89   Resp: 18   Temp: 97.8 °F (36.6 °C)       GENERAL:  In no apparent distress.    HEAD:  No signs of head trauma.  EYES:  Pupils are equal.  Extraocular motions intact.    EARS:  Hearing grossly intact.  MOUTH:  Oropharynx is normal.   NECK:  No adenopathy, no JVD.     CHEST:  Chest with clear breath sounds bilaterally.  No wheezes, rales, rhonchi.    CARDIAC:  Regular rate and rhythm.  S1 and S2, without murmurs, gallops, rubs.  VASCULAR:  No Edema.  Peripheral pulses normal and equal in all extremities.  ABDOMEN:  Soft, without detectable tenderness.  No sign of distention.  No   rebound or guarding, and no masses palpated.   Bowel Sounds normal.  MUSCULOSKELETAL:  Good range of motion of all major joints. Extremities without clubbing, cyanosis or edema.    NEUROLOGIC EXAM:  Alert and oriented x 3.  No focal sensory or strength deficits.   Speech normal.  Follows commands.  PSYCHIATRIC:  Mood normal.    No results for input(s): "CBC" in the last 72 hours.   No results for input(s): "CMP" in the last 72 hours.     Assessment:  Problem List Items Addressed This Visit       History of follicular lymphoma    LGSIL on Pap smear of cervix    Enlarged lacrimal gland, right    Recurrent lymphoma    Lacrimal gland inflammation, right - Primary    Non-Hodgkin's lymphoma of skin         Orders for 02/12/2025:   Follow-up with Ophthalmology/ENT for surveillance of periorbital lymphoma  In 6 months, surveillance CT scans of C/A/P/soft " tissues of the head and neck/orbits, then follow-up with CBC, CMP, LDH  Need the report of mammogram performed 02/07/2025  Follow-up in 6 months, with scans and labs    Above discussed with the patient.  All questions answered.    Discussed labs and scans and gave her copies of relevant results.  She understands and agrees with this plan.  =================================    # History of follicular lymphoma, grade 1, stage III, diagnosed November 2013:  -diagnosed 11/2013  -CTs C/A/P/neck 10/2013, PET-CT 12/2013:  Bulky lymphadenopathy, largest up to 14 cm, both above and below the diaphragm  -S/P R-CHOP x6 cycles 12/2013-04/2024  -restaging PET-CT post 3 cycles, 02/2014:  Positive response  -restaging PET-CT 05/14/2024:  Complete response  -S/P bone marrow biopsy 08/14/2024  -S/P maintenance Rituxan every 2 weeks X 12 cycles, 09/03/2014-05/19/2016  -restaging CTs 01/11/2016, 08/2016:  CLARITZA  -restaging CTs 08/31/2017:  CLARITZA  -restaging CTs 08/06/2018: CLARITZA  -restaging CTs 08/06/2018: Soft tissue prominence along the greater curvature of distal gastric body and gastric pylorus (ingested material versus other pathology)  -screening colonoscopy 05/02/2016: Moderate diffuse diverticulosis  -DEXA scan (08/20/2018): Osteopenia of both femurs  -Screening mammogram (01/17/2019): No malignancy  -restaging CTs 08/13/2019:  CLARITZA  -restaging CTs 08/13/2019:  Questionable soft tissue prominence of stomach greater curvature and along the anterior aspect of antrum  (She declined GI evaluation)  -restaging CTs 08/20/2020:  CLARITZA  -flow cytometry of blood 09/04/2020:  Negative for leukemia/lymphoma   -restaging CTs C/A/P/neck 09/16/2021:  CLARITZA  -restaging CTs C/A/P/neck 12/13/2022:  Interval development of some subcarinal lymph nodes, largest 1.9 cm   -restaging CTs C/A/P/neck 03/15/2023:  CLARITZA  -restaging CTs C/A/P/neck 03/15/2024: CLARITZA  >>>  # Recurrent follicular lymphoma, grade 1-2 (chemosis; enlargement of right lacrimal gland;  inflammation right eye; skin lesion/bumps just lateral to the right eye 10/2024:  -11/18/2024:  On follow-up visit, she said that she noticed lumps on the skin just lateral to right eye in October 2024.  The lesion is not enlarging.  No pain or itching.  At the same time, her Ob gyn noticed inflammation of the right eye.  She had biopsy of the skin lesion done by Dr. Jeremi Wills MD; she denied any systemic symptoms/B symptoms  -10/23/2024: CT orbits without IV contrast (ordered by Kirit Barone MD, [facility], for evaluation of chemosis and enlargement of lacrimal gland of right eye, irritated red eye, discharge, headache, itching, swelling in eyelid and tearing): Abnormal asymmetric enlargement of right lacrimal gland (32 x 24 x 11 mm; for example, left lacrimal gland measures 6 x 5 x 10 mm), worrisome for lymphoma recurrence within the lacrimal gland  -right orbital mass biopsy 11/01/2024: Low-grade B-cell lymphoma, consistent with recurrent follicular lymphoma, grade 1-2  -FDG PET-CT 11/13/2024:  CLARITZA  -S/P radiotherapy to right orbit 12/19/2024-01/07/2025  -screening mammogram 02/07/2025:  Report pending  -DEXA scan 02/07/2025:  Osteopenia based on hips  -02/12/2025:  Office visit: Clinically, has responded very well; right periorbital swelling has resolved  >>>  Plan:  -follow-up with ophthalmology/ENT for surveillance  -for surveillance from medical oncology standpoint, we will order contrast-enhanced CT scans of C/A/P/soft tissues of the head and neck/orbits, to be performed in 6 months, then follow-up  -may avoid bone marrow biopsy at this time    # Low-grade squamous intraepithelial lesion of cervix:  -02/15/2019: ThinPrep cervical Pap smear: Low-grade squamous intraepithelial lesion  -03/19/2019:  1. LEEP: Focal mild squamous dysplasia (GUILLERMINA I) with associated HPV effect; the dysplasia focally extends to the equal margin  2.  Endocervical curettage: No atypical or dysplastic cells  identified  -06/20/2019: ThinPrep cervical Pap smear: Negative for intraepithelial lesion or malignancy  -08/13/2019: ThinPrep cervical Pap smear: Atypical squamous cells of undetermined significance; high risk HPV positive; high risk HPV 16, 18/45 negative  -03/11/2020: ThinPrep cervical Pap smear: Negative for intraepithelial malignancy  -09/01/2020: ThinPrep cervical Pap smear: Negative for intraepithelial lesion or malignancy; high risk HPV negative  -09/07/2021: ThinPrep cervical Pap smear: NIL    -Screening colonoscopy (05/02/2016): Moderate diffuse diverticulosis; otherwise, normal exam to cecum  -Next colonoscopy will be due in 10 years (05/2026)  ------------------------------------------------------------------            NCCN guidelines: Surveillance for follicular lymphoma:  Completed 6 cycles of R-CHOP 04/2014  Clinical H&P and labs every 3-6 months for 5 years (until 04/2019), then annually or as clinically indicated  Surveillance imaging should be performed whenever there are clinical indications  Surveillance imaging up to 2 years (until 04/2016) post completion of treatment: C/A/P CT scans with contrast no more than every 6 months  After 2 years (after 04/2016), surveillance imaging no more frequently than annually          Follow-up:  No follow-ups on file.

## 2025-02-12 NOTE — Clinical Note
Orders for 02/12/2025:  Follow-up with Ophthalmology/ENT for surveillance of periorbital lymphoma In 6 months, surveillance CT scans of C/A/P/soft tissues of the head and neck, then follow-up with CBC, CMP, LDH Need the report of mammogram performed 02/07/2025 Follow-up in 6 months, with scans and labs

## 2025-03-23 NOTE — Clinical Note
Orders for today:   In 1 year (March 2024), surveillance CT scans of C/A/P/soft tissues of the neck with contrast for surveillance of lymphoma  Screening mammogram in November   Colonoscopy in May 2026  Follow-up in 1 year, with scans, CBC, CMP, and LDH level.  The patient is Watcher - Medium risk of patient condition declining or worsening    Shift Goals  Clinical Goals: pain management, monitor O2 and respiratory  Patient Goals: rest, comfort  Family Goals: BUDDY    Progress made toward(s) clinical / shift goals: Pain controlled during shift to allow patient to sleep comfortably and maintain respiratory drive. Patient continues to have dyspnea. Labs trending positively. Remains free of falls.     Problem: Respiratory  Goal: Patient will achieve/maintain optimum respiratory ventilation and gas exchange  Outcome: Progressing     Problem: Risk for Aspiration  Goal: Patient's risk for aspiration will be absent or decrease  Outcome: Progressing     Problem: Knowledge Deficit - Standard  Goal: Patient and family/care givers will demonstrate understanding of plan of care, disease process/condition, diagnostic tests and medications  Outcome: Progressing     Problem: Hemodynamics  Goal: Patient's hemodynamics, fluid balance and neurologic status will be stable or improve  Outcome: Progressing     Problem: Pain - Standard  Goal: Alleviation of pain or a reduction in pain to the patient’s comfort goal  Outcome: Progressing     Problem: Fall Risk  Goal: Patient will remain free from falls  Outcome: Progressing       Patient is not progressing towards the following goals:

## 2025-03-24 DIAGNOSIS — C85.99 ORBITAL LYMPHOMA: Primary | ICD-10-CM

## 2025-04-04 ENCOUNTER — HOSPITAL ENCOUNTER (OUTPATIENT)
Dept: RADIOLOGY | Facility: HOSPITAL | Age: 75
Discharge: HOME | End: 2025-04-04
Attending: RADIOLOGY
Payer: MEDICARE

## 2025-04-04 DIAGNOSIS — C85.99 ORBITAL LYMPHOMA: ICD-10-CM

## 2025-04-04 LAB
CREAT SERPL-MCNC: 0.83 MG/DL (ref 0.55–1.02)
GFR SERPLBLD CREATININE-BSD FMLA CKD-EPI: >60 ML/MIN/1.73/M2

## 2025-04-04 PROCEDURE — 82565 ASSAY OF CREATININE: CPT | Performed by: RADIOLOGY

## 2025-04-04 PROCEDURE — 25500020 PHARM REV CODE 255: Performed by: RADIOLOGY

## 2025-04-04 PROCEDURE — 70481 CT ORBIT/EAR/FOSSA W/DYE: CPT | Mod: TC

## 2025-04-04 RX ADMIN — IOHEXOL 100 ML: 350 INJECTION, SOLUTION INTRAVENOUS at 09:04

## 2025-07-31 DIAGNOSIS — C85.90 RECURRENT LYMPHOMA: Primary | ICD-10-CM

## 2025-07-31 DIAGNOSIS — C85.99 NON-HODGKIN'S LYMPHOMA OF SKIN: ICD-10-CM

## 2025-07-31 DIAGNOSIS — H04.001 LACRIMAL GLAND INFLAMMATION, RIGHT: ICD-10-CM

## 2025-07-31 DIAGNOSIS — H04.031: ICD-10-CM

## 2025-08-11 ENCOUNTER — HOSPITAL ENCOUNTER (OUTPATIENT)
Dept: RADIOLOGY | Facility: HOSPITAL | Age: 75
Discharge: HOME | End: 2025-08-11
Attending: INTERNAL MEDICINE
Payer: MEDICARE

## 2025-08-11 DIAGNOSIS — H04.001 LACRIMAL GLAND INFLAMMATION, RIGHT: ICD-10-CM

## 2025-08-11 DIAGNOSIS — Z08 ENCOUNTER FOR FOLLOW-UP SURVEILLANCE OF LYMPHOMA: ICD-10-CM

## 2025-08-11 DIAGNOSIS — R87.612 LGSIL ON PAP SMEAR OF CERVIX: ICD-10-CM

## 2025-08-11 DIAGNOSIS — Z85.72 HISTORY OF FOLLICULAR LYMPHOMA: ICD-10-CM

## 2025-08-11 DIAGNOSIS — C85.90 RECURRENT LYMPHOMA: ICD-10-CM

## 2025-08-11 DIAGNOSIS — Z85.72 ENCOUNTER FOR FOLLOW-UP SURVEILLANCE OF LYMPHOMA: ICD-10-CM

## 2025-08-11 DIAGNOSIS — H04.031: ICD-10-CM

## 2025-08-11 LAB
CREAT SERPL-MCNC: 0.88 MG/DL (ref 0.55–1.02)
GFR SERPLBLD CREATININE-BSD FMLA CKD-EPI: >60 ML/MIN/1.73/M2

## 2025-08-11 PROCEDURE — 70481 CT ORBIT/EAR/FOSSA W/DYE: CPT | Mod: TC

## 2025-08-11 PROCEDURE — 25500020 PHARM REV CODE 255: Performed by: INTERNAL MEDICINE

## 2025-08-11 PROCEDURE — 74177 CT ABD & PELVIS W/CONTRAST: CPT | Mod: TC

## 2025-08-11 PROCEDURE — 82565 ASSAY OF CREATININE: CPT | Performed by: INTERNAL MEDICINE

## 2025-08-11 PROCEDURE — 70491 CT SOFT TISSUE NECK W/DYE: CPT | Mod: TC

## 2025-08-11 RX ADMIN — IOHEXOL 100 ML: 350 INJECTION, SOLUTION INTRAVENOUS at 09:08

## 2025-08-15 ENCOUNTER — TELEPHONE (OUTPATIENT)
Dept: HEMATOLOGY/ONCOLOGY | Facility: CLINIC | Age: 75
End: 2025-08-15
Payer: MEDICARE

## 2025-08-25 ENCOUNTER — TELEPHONE (OUTPATIENT)
Dept: HEMATOLOGY/ONCOLOGY | Facility: CLINIC | Age: 75
End: 2025-08-25
Payer: MEDICARE

## 2025-08-26 ENCOUNTER — LAB VISIT (OUTPATIENT)
Dept: HEMATOLOGY/ONCOLOGY | Facility: CLINIC | Age: 75
End: 2025-08-26
Attending: INTERNAL MEDICINE
Payer: MEDICARE

## 2025-08-26 VITALS
RESPIRATION RATE: 18 BRPM | DIASTOLIC BLOOD PRESSURE: 71 MMHG | SYSTOLIC BLOOD PRESSURE: 113 MMHG | TEMPERATURE: 98 F | HEART RATE: 104 BPM | WEIGHT: 140.19 LBS | BODY MASS INDEX: 25.8 KG/M2 | HEIGHT: 62 IN | OXYGEN SATURATION: 96 %

## 2025-08-26 DIAGNOSIS — Z85.72 ENCOUNTER FOR FOLLOW-UP SURVEILLANCE OF LYMPHOMA: ICD-10-CM

## 2025-08-26 DIAGNOSIS — C85.99 NON-HODGKIN'S LYMPHOMA OF SKIN: ICD-10-CM

## 2025-08-26 DIAGNOSIS — H04.031: Primary | ICD-10-CM

## 2025-08-26 DIAGNOSIS — R87.612 LGSIL ON PAP SMEAR OF CERVIX: ICD-10-CM

## 2025-08-26 DIAGNOSIS — H04.001 LACRIMAL GLAND INFLAMMATION, RIGHT: ICD-10-CM

## 2025-08-26 DIAGNOSIS — Z12.31 SCREENING MAMMOGRAM FOR BREAST CANCER: ICD-10-CM

## 2025-08-26 DIAGNOSIS — C85.90 RECURRENT LYMPHOMA: ICD-10-CM

## 2025-08-26 DIAGNOSIS — Z85.72 HISTORY OF FOLLICULAR LYMPHOMA: ICD-10-CM

## 2025-08-26 DIAGNOSIS — Z08 ENCOUNTER FOR FOLLOW-UP SURVEILLANCE OF LYMPHOMA: ICD-10-CM

## 2025-08-26 DIAGNOSIS — Z12.39 SCREENING BREAST EXAMINATION: Primary | ICD-10-CM

## 2025-08-26 DIAGNOSIS — E83.52 HYPERCALCEMIA: ICD-10-CM

## 2025-08-26 DIAGNOSIS — R59.0 MEDIASTINAL LYMPHADENOPATHY: ICD-10-CM

## 2025-08-26 PROCEDURE — 99214 OFFICE O/P EST MOD 30 MIN: CPT | Mod: S$PBB,,, | Performed by: INTERNAL MEDICINE

## 2025-08-26 PROCEDURE — 99214 OFFICE O/P EST MOD 30 MIN: CPT | Mod: PBBFAC | Performed by: INTERNAL MEDICINE

## 2025-08-26 RX ORDER — ROSUVASTATIN CALCIUM 20 MG/1
TABLET, COATED ORAL
COMMUNITY
Start: 2025-05-13